# Patient Record
Sex: MALE | Race: WHITE | NOT HISPANIC OR LATINO | URBAN - METROPOLITAN AREA
[De-identification: names, ages, dates, MRNs, and addresses within clinical notes are randomized per-mention and may not be internally consistent; named-entity substitution may affect disease eponyms.]

---

## 2020-01-15 ENCOUNTER — INPATIENT (INPATIENT)
Facility: HOSPITAL | Age: 55
LOS: 2 days | Discharge: HOME | DRG: 247 | End: 2020-01-18
Attending: HOSPITALIST | Admitting: INTERNAL MEDICINE
Payer: COMMERCIAL

## 2020-01-15 ENCOUNTER — EMERGENCY (EMERGENCY)
Facility: HOSPITAL | Age: 55
LOS: 1 days | Discharge: ANOTHER HOSPITAL | End: 2020-01-15
Attending: EMERGENCY MEDICINE | Admitting: EMERGENCY MEDICINE
Payer: COMMERCIAL

## 2020-01-15 VITALS
TEMPERATURE: 98 F | HEART RATE: 74 BPM | OXYGEN SATURATION: 97 % | SYSTOLIC BLOOD PRESSURE: 136 MMHG | DIASTOLIC BLOOD PRESSURE: 90 MMHG | RESPIRATION RATE: 18 BRPM

## 2020-01-15 VITALS
HEART RATE: 78 BPM | OXYGEN SATURATION: 100 % | TEMPERATURE: 98 F | DIASTOLIC BLOOD PRESSURE: 98 MMHG | SYSTOLIC BLOOD PRESSURE: 142 MMHG | RESPIRATION RATE: 18 BRPM

## 2020-01-15 VITALS
DIASTOLIC BLOOD PRESSURE: 91 MMHG | SYSTOLIC BLOOD PRESSURE: 136 MMHG | WEIGHT: 220.46 LBS | TEMPERATURE: 98 F | HEART RATE: 62 BPM | OXYGEN SATURATION: 99 % | RESPIRATION RATE: 16 BRPM | HEIGHT: 67 IN

## 2020-01-15 DIAGNOSIS — Z98.890 OTHER SPECIFIED POSTPROCEDURAL STATES: Chronic | ICD-10-CM

## 2020-01-15 DIAGNOSIS — I21.21 ST ELEVATION (STEMI) MYOCARDIAL INFARCTION INVOLVING LEFT CIRCUMFLEX CORONARY ARTERY: ICD-10-CM

## 2020-01-15 DIAGNOSIS — I25.10 ATHEROSCLEROTIC HEART DISEASE OF NATIVE CORONARY ARTERY WITHOUT ANGINA PECTORIS: ICD-10-CM

## 2020-01-15 LAB
ALBUMIN SERPL ELPH-MCNC: 3.9 G/DL — SIGNIFICANT CHANGE UP (ref 3.3–5)
ALBUMIN SERPL ELPH-MCNC: 4.2 G/DL — SIGNIFICANT CHANGE UP (ref 3.3–5)
ALP SERPL-CCNC: 84 U/L — SIGNIFICANT CHANGE UP (ref 40–120)
ALP SERPL-CCNC: 91 U/L — SIGNIFICANT CHANGE UP (ref 40–120)
ALT FLD-CCNC: 39 U/L — SIGNIFICANT CHANGE UP (ref 12–78)
ALT FLD-CCNC: 59 U/L — HIGH (ref 10–45)
ANION GAP SERPL CALC-SCNC: 17 MMOL/L — SIGNIFICANT CHANGE UP (ref 5–17)
ANION GAP SERPL CALC-SCNC: 8 MMOL/L — SIGNIFICANT CHANGE UP (ref 5–17)
APTT BLD: 33.8 SEC — SIGNIFICANT CHANGE UP (ref 28.5–37)
AST SERPL-CCNC: 272 U/L — HIGH (ref 10–40)
AST SERPL-CCNC: 35 U/L — SIGNIFICANT CHANGE UP (ref 15–37)
BILIRUB SERPL-MCNC: 0.3 MG/DL — SIGNIFICANT CHANGE UP (ref 0.2–1.2)
BILIRUB SERPL-MCNC: 0.4 MG/DL — SIGNIFICANT CHANGE UP (ref 0.2–1.2)
BUN SERPL-MCNC: 12 MG/DL — SIGNIFICANT CHANGE UP (ref 7–23)
BUN SERPL-MCNC: 13 MG/DL — SIGNIFICANT CHANGE UP (ref 7–23)
CALCIUM SERPL-MCNC: 8.5 MG/DL — SIGNIFICANT CHANGE UP (ref 8.5–10.1)
CALCIUM SERPL-MCNC: 8.8 MG/DL — SIGNIFICANT CHANGE UP (ref 8.4–10.5)
CHLORIDE SERPL-SCNC: 103 MMOL/L — SIGNIFICANT CHANGE UP (ref 96–108)
CHLORIDE SERPL-SCNC: 109 MMOL/L — HIGH (ref 96–108)
CO2 SERPL-SCNC: 18 MMOL/L — LOW (ref 22–31)
CO2 SERPL-SCNC: 24 MMOL/L — SIGNIFICANT CHANGE UP (ref 22–31)
CREAT SERPL-MCNC: 0.77 MG/DL — SIGNIFICANT CHANGE UP (ref 0.5–1.3)
CREAT SERPL-MCNC: 0.91 MG/DL — SIGNIFICANT CHANGE UP (ref 0.5–1.3)
GLUCOSE SERPL-MCNC: 113 MG/DL — HIGH (ref 70–99)
GLUCOSE SERPL-MCNC: 119 MG/DL — HIGH (ref 70–99)
HCT VFR BLD CALC: 42.1 % — SIGNIFICANT CHANGE UP (ref 39–50)
HCT VFR BLD CALC: 42.5 % — SIGNIFICANT CHANGE UP (ref 39–50)
HGB BLD-MCNC: 14.9 G/DL — SIGNIFICANT CHANGE UP (ref 13–17)
HGB BLD-MCNC: 15.1 G/DL — SIGNIFICANT CHANGE UP (ref 13–17)
INR BLD: 1.01 RATIO — SIGNIFICANT CHANGE UP (ref 0.88–1.16)
LACTATE SERPL-SCNC: 1.4 MMOL/L — SIGNIFICANT CHANGE UP (ref 0.7–2)
MAGNESIUM SERPL-MCNC: 2 MG/DL — SIGNIFICANT CHANGE UP (ref 1.6–2.6)
MCHC RBC-ENTMCNC: 29.4 PG — SIGNIFICANT CHANGE UP (ref 27–34)
MCHC RBC-ENTMCNC: 29.8 PG — SIGNIFICANT CHANGE UP (ref 27–34)
MCHC RBC-ENTMCNC: 35.1 GM/DL — SIGNIFICANT CHANGE UP (ref 32–36)
MCHC RBC-ENTMCNC: 35.9 GM/DL — SIGNIFICANT CHANGE UP (ref 32–36)
MCV RBC AUTO: 83 FL — SIGNIFICANT CHANGE UP (ref 80–100)
MCV RBC AUTO: 84 FL — SIGNIFICANT CHANGE UP (ref 80–100)
NRBC # BLD: 0 /100 WBCS — SIGNIFICANT CHANGE UP (ref 0–0)
NRBC # BLD: 0 /100 WBCS — SIGNIFICANT CHANGE UP (ref 0–0)
NT-PROBNP SERPL-SCNC: 169 PG/ML — SIGNIFICANT CHANGE UP (ref 0–300)
PHOSPHATE SERPL-MCNC: 2.5 MG/DL — SIGNIFICANT CHANGE UP (ref 2.5–4.5)
PLATELET # BLD AUTO: 255 K/UL — SIGNIFICANT CHANGE UP (ref 150–400)
PLATELET # BLD AUTO: 257 K/UL — SIGNIFICANT CHANGE UP (ref 150–400)
POTASSIUM SERPL-MCNC: 3.8 MMOL/L — SIGNIFICANT CHANGE UP (ref 3.5–5.3)
POTASSIUM SERPL-MCNC: 4.1 MMOL/L — SIGNIFICANT CHANGE UP (ref 3.5–5.3)
POTASSIUM SERPL-SCNC: 3.8 MMOL/L — SIGNIFICANT CHANGE UP (ref 3.5–5.3)
POTASSIUM SERPL-SCNC: 4.1 MMOL/L — SIGNIFICANT CHANGE UP (ref 3.5–5.3)
PROT SERPL-MCNC: 7 G/DL — SIGNIFICANT CHANGE UP (ref 6–8.3)
PROT SERPL-MCNC: 7.6 G/DL — SIGNIFICANT CHANGE UP (ref 6–8.3)
PROTHROM AB SERPL-ACNC: 11.4 SEC — SIGNIFICANT CHANGE UP (ref 10–12.9)
RBC # BLD: 5.06 M/UL — SIGNIFICANT CHANGE UP (ref 4.2–5.8)
RBC # BLD: 5.07 M/UL — SIGNIFICANT CHANGE UP (ref 4.2–5.8)
RBC # FLD: 12.8 % — SIGNIFICANT CHANGE UP (ref 10.3–14.5)
RBC # FLD: 13 % — SIGNIFICANT CHANGE UP (ref 10.3–14.5)
SODIUM SERPL-SCNC: 138 MMOL/L — SIGNIFICANT CHANGE UP (ref 135–145)
SODIUM SERPL-SCNC: 141 MMOL/L — SIGNIFICANT CHANGE UP (ref 135–145)
TROPONIN I SERPL-MCNC: 0.5 NG/ML — HIGH (ref 0.01–0.04)
WBC # BLD: 11.93 K/UL — HIGH (ref 3.8–10.5)
WBC # BLD: 12.52 K/UL — HIGH (ref 3.8–10.5)
WBC # FLD AUTO: 11.93 K/UL — HIGH (ref 3.8–10.5)
WBC # FLD AUTO: 12.52 K/UL — HIGH (ref 3.8–10.5)

## 2020-01-15 PROCEDURE — 96374 THER/PROPH/DIAG INJ IV PUSH: CPT

## 2020-01-15 PROCEDURE — 99152 MOD SED SAME PHYS/QHP 5/>YRS: CPT | Mod: GC

## 2020-01-15 PROCEDURE — 99284 EMERGENCY DEPT VISIT MOD MDM: CPT

## 2020-01-15 PROCEDURE — 93010 ELECTROCARDIOGRAM REPORT: CPT

## 2020-01-15 PROCEDURE — 85610 PROTHROMBIN TIME: CPT

## 2020-01-15 PROCEDURE — 99285 EMERGENCY DEPT VISIT HI MDM: CPT | Mod: 25

## 2020-01-15 PROCEDURE — 85027 COMPLETE CBC AUTOMATED: CPT

## 2020-01-15 PROCEDURE — 96361 HYDRATE IV INFUSION ADD-ON: CPT

## 2020-01-15 PROCEDURE — 93458 L HRT ARTERY/VENTRICLE ANGIO: CPT | Mod: 26,59,GC

## 2020-01-15 PROCEDURE — 80053 COMPREHEN METABOLIC PANEL: CPT

## 2020-01-15 PROCEDURE — 36415 COLL VENOUS BLD VENIPUNCTURE: CPT

## 2020-01-15 PROCEDURE — 92941 PRQ TRLML REVSC TOT OCCL AMI: CPT | Mod: LC,GC

## 2020-01-15 PROCEDURE — 71045 X-RAY EXAM CHEST 1 VIEW: CPT

## 2020-01-15 PROCEDURE — 93005 ELECTROCARDIOGRAM TRACING: CPT

## 2020-01-15 PROCEDURE — 71045 X-RAY EXAM CHEST 1 VIEW: CPT | Mod: 26

## 2020-01-15 PROCEDURE — 99223 1ST HOSP IP/OBS HIGH 75: CPT | Mod: GC

## 2020-01-15 PROCEDURE — 85730 THROMBOPLASTIN TIME PARTIAL: CPT

## 2020-01-15 PROCEDURE — 84484 ASSAY OF TROPONIN QUANT: CPT

## 2020-01-15 RX ORDER — TICAGRELOR 90 MG/1
90 TABLET ORAL EVERY 12 HOURS
Refills: 0 | Status: DISCONTINUED | OUTPATIENT
Start: 2020-01-16 | End: 2020-01-18

## 2020-01-15 RX ORDER — TICAGRELOR 90 MG/1
180 TABLET ORAL ONCE
Refills: 0 | Status: DISCONTINUED | OUTPATIENT
Start: 2020-01-15 | End: 2020-01-15

## 2020-01-15 RX ORDER — ASPIRIN/CALCIUM CARB/MAGNESIUM 324 MG
325 TABLET ORAL ONCE
Refills: 0 | Status: COMPLETED | OUTPATIENT
Start: 2020-01-15 | End: 2020-01-15

## 2020-01-15 RX ORDER — INFLUENZA VIRUS VACCINE 15; 15; 15; 15 UG/.5ML; UG/.5ML; UG/.5ML; UG/.5ML
0.5 SUSPENSION INTRAMUSCULAR ONCE
Refills: 0 | Status: COMPLETED | OUTPATIENT
Start: 2020-01-15 | End: 2020-01-18

## 2020-01-15 RX ORDER — MORPHINE SULFATE 50 MG/1
4 CAPSULE, EXTENDED RELEASE ORAL ONCE
Refills: 0 | Status: COMPLETED | OUTPATIENT
Start: 2020-01-15 | End: 2020-01-15

## 2020-01-15 RX ORDER — ASPIRIN/CALCIUM CARB/MAGNESIUM 324 MG
81 TABLET ORAL DAILY
Refills: 0 | Status: DISCONTINUED | OUTPATIENT
Start: 2020-01-16 | End: 2020-01-18

## 2020-01-15 RX ORDER — HEPARIN SODIUM 5000 [USP'U]/ML
4000 INJECTION INTRAVENOUS; SUBCUTANEOUS ONCE
Refills: 0 | Status: COMPLETED | OUTPATIENT
Start: 2020-01-15 | End: 2020-01-15

## 2020-01-15 RX ORDER — ONDANSETRON 8 MG/1
4 TABLET, FILM COATED ORAL ONCE
Refills: 0 | Status: DISCONTINUED | OUTPATIENT
Start: 2020-01-15 | End: 2020-02-06

## 2020-01-15 RX ORDER — PANTOPRAZOLE SODIUM 20 MG/1
40 TABLET, DELAYED RELEASE ORAL
Refills: 0 | Status: DISCONTINUED | OUTPATIENT
Start: 2020-01-15 | End: 2020-01-16

## 2020-01-15 RX ORDER — CLOPIDOGREL BISULFATE 75 MG/1
300 TABLET, FILM COATED ORAL ONCE
Refills: 0 | Status: COMPLETED | OUTPATIENT
Start: 2020-01-15 | End: 2020-01-15

## 2020-01-15 RX ORDER — CHLORHEXIDINE GLUCONATE 213 G/1000ML
1 SOLUTION TOPICAL
Refills: 0 | Status: DISCONTINUED | OUTPATIENT
Start: 2020-01-15 | End: 2020-01-17

## 2020-01-15 RX ORDER — ATORVASTATIN CALCIUM 80 MG/1
80 TABLET, FILM COATED ORAL AT BEDTIME
Refills: 0 | Status: DISCONTINUED | OUTPATIENT
Start: 2020-01-15 | End: 2020-01-18

## 2020-01-15 RX ORDER — SODIUM CHLORIDE 9 MG/ML
1000 INJECTION INTRAMUSCULAR; INTRAVENOUS; SUBCUTANEOUS ONCE
Refills: 0 | Status: COMPLETED | OUTPATIENT
Start: 2020-01-15 | End: 2020-01-15

## 2020-01-15 RX ORDER — HEPARIN SODIUM 5000 [USP'U]/ML
INJECTION INTRAVENOUS; SUBCUTANEOUS
Qty: 25000 | Refills: 0 | Status: DISCONTINUED | OUTPATIENT
Start: 2020-01-15 | End: 2020-02-06

## 2020-01-15 RX ADMIN — CLOPIDOGREL BISULFATE 300 MILLIGRAM(S): 75 TABLET, FILM COATED ORAL at 20:32

## 2020-01-15 RX ADMIN — HEPARIN SODIUM 1000 UNIT(S)/HR: 5000 INJECTION INTRAVENOUS; SUBCUTANEOUS at 20:54

## 2020-01-15 RX ADMIN — Medication 325 MILLIGRAM(S): at 20:27

## 2020-01-15 RX ADMIN — HEPARIN SODIUM 4000 UNIT(S): 5000 INJECTION INTRAVENOUS; SUBCUTANEOUS at 20:27

## 2020-01-15 RX ADMIN — SODIUM CHLORIDE 1000 MILLILITER(S): 9 INJECTION INTRAMUSCULAR; INTRAVENOUS; SUBCUTANEOUS at 20:45

## 2020-01-15 RX ADMIN — SODIUM CHLORIDE 1000 MILLILITER(S): 9 INJECTION INTRAMUSCULAR; INTRAVENOUS; SUBCUTANEOUS at 21:46

## 2020-01-15 RX ADMIN — CLOPIDOGREL BISULFATE 300 MILLIGRAM(S): 75 TABLET, FILM COATED ORAL at 20:58

## 2020-01-15 NOTE — H&P ADULT - NSHPPHYSICALEXAM_GEN_ALL_CORE
PHYSICAL EXAM:  GENERAL: NAD, well-developed  HEAD:  Atraumatic, Normocephalic, patient with enlarged non tender  R posterior auricular LAD   EYES: EOMI, PERRLA, conjunctiva and sclera clear  NECK: Supple, No JVD,  CHEST/LUNG: Clear to auscultation bilaterally; No wheeze  HEART: Regular rate and rhythm; No murmurs, rubs, or gallops  ABDOMEN: Soft, Nontender, Nondistended; Bowel sounds present  EXTREMITIES:, No clubbing, cyanosis, or edema, femoral sheath in place   PSYCH: AAOx3  NEUROLOGY: non-focal  SKIN: No rashes or lesions

## 2020-01-15 NOTE — H&P ADULT - NSHPREVIEWOFSYSTEMS_GEN_ALL_CORE
REVIEW OF SYSTEMS:  CONSTITUTIONAL: No weakness, fevers or chills  EYES/ENT: No visual changes;  No vertigo or throat pain   NECK: No pain or stiffness  RESPIRATORY: No cough, wheezing, hemoptysis; No shortness of breath  CARDIOVASCULAR: 5/10 CP radiating to the left jaw and arm   GASTROINTESTINAL: No abdominal or epigastric pain. No nausea, vomiting, or hematemesis; No diarrhea or constipation. No melena or hematochezia.  GENITOURINARY: No dysuria, frequency or hematuria  NEUROLOGICAL: No numbness or weakness  SKIN: No itching, burning, rashes, or lesions   All other review of systems is negative unless indicated above.

## 2020-01-15 NOTE — H&P ADULT - HISTORY OF PRESENT ILLNESS
53 yo M PMHx gastritis initially presenting with chief complaint of chest pain radiating to the jaw and left arm admitted with STEMI. Patient states that he is employed as a , he noticed yesterday afternoon he experienced severe crushing chest pain 10/10 in severity radiating to his left arm. He was diaphoretic, nauseas, vomited X1 non bloody, non bilious emesis. He took Advil with little to no relief.  He states the pain was significantly reduced 2 hours later and he was able to rest in bed overnight. Today, however he noticed around 6 pm that he had significant 10/10 pain once more. He went to an outpatient clinic where he was found to have EKG changes and [facility] noted findings of inferolateral wall STEMI. Troponin I with .503, patient remained to be hemodynamically stable, saturating 99% on RA.  He was loaded with ASA and plavix at [place], was taken to cath lab, found to have 100% stenosis to the circumflex,  DESX1 .      Vitals on triage: /91, Hr 62, RR 16,T 97.5,99 % room air

## 2020-01-15 NOTE — ED PROVIDER NOTE - OBJECTIVE STATEMENT
55 yo M PMHx gastritis presents to ED c/o left sided chest pain radiating into jaw x last night. Pt states that pain started suddenly while at work yesterday afternoon (pt works in construction)- Pt took OTC Motrin before bed last night which relieved the pain slightly and he went to sleep. States pain returned a few hours prior to ED arrival, more severe in intensity. Pt reports associated shortness of breath.

## 2020-01-15 NOTE — ED ADULT NURSE NOTE - OBJECTIVE STATEMENT
Received from Home with c/o chest pain since 6pm when at work. Took and Advil to no relief. Pain in the left side of chest radiating to left back , left jaw and left hand with slight nausea. AO4, Ambulatory. Vitals stable now. Started O2 at 2L by NC. EKG and CXR done. Line and labs done. To be transferred to [facility] [facility] lab.

## 2020-01-15 NOTE — ED PROVIDER NOTE - ATTENDING CONTRIBUTION TO CARE
I have personally performed a face to face diagnostic evaluation on this patient.  I have reviewed the PA note and agree with the history, exam, and plan of care, except as noted.  History and Exam by me shows patient presents to ER with friend c/o chest pain, patient states he had chest pain yesterday while at work that improved with advil, today at 6pm patient chest pain returned, left sided, radiating to left neck and bilateral arms, patient at first went to an urgent care but was told to go to nearest ER, patient alert and oriented, heart and lungs clear, abdomen soft, no pedal edema, f/u ekg, labs, chest xray, cardiology.
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
## 2020-01-15 NOTE — H&P ADULT - ASSESSMENT
54 year old male with PMHx for gastritis presents from [facility] with EKG findings consistent with inferior lateral wall stemi, s/p revascularization with 1 stent placed to the circumflex artery    #Neuro  -Aox3, no active issues     #Resp  Saturating 99% on RA     #CV  Inferolateral wall stemi   - Aneta criteria III/ III Substernal chest pain, worsened with exertion and relieved with rest   - EKG with evidence of ST elevations in II ,III, AVF and V4-V6, ST depressions in leads v1-v3  - Troponin I positive at [place] .501, will continue to trend cardiac enzymes to peak    - S/P LHC with findings c/w 100% stenosis to circumflex artery X 1 GUILLERMO   - Prior to LHC was ASA and plavix loaded at OSH, following LHC loaded with brillinta   - Will continue with DAPT w/ ASA and Brillinta   - Will pull the sheath 4 hours following cath, monitor groin site overnight   - Patient remains to have slight CP which he reports to be 5/10 in severity will continue to monitor clinically   - Currently Hemodynamically stable  - lipid profile currently pendng     #GI  Gastritis     - C/w home  pantoprazole     #ID  - Slight leukocytosis, likely reactive   - will continue to monitor  - No active signs of infection     #Renal  - No renal issues   - Will monitor with daily BMP, following contrast load from cath     #Heme  -No active issues     #Endo  - TSH, hemoglobin a1c pending     #DVT PPx  - Following removal of sheath, hep sub q

## 2020-01-15 NOTE — H&P ADULT - NSHPLABSRESULTS_GEN_ALL_CORE
15.1   11.93 )-----------( 257      ( 15 Kamran 2020 20:40 )             42.1   01-15    141  |  109<H>  |  13  ----------------------------<  113<H>  3.8   |  24  |  0.91    Ca    8.5      15 Kamran 2020 20:41    TPro  7.6  /  Alb  3.9  /  TBili  0.3  /  DBili  x   /  AST  35  /  ALT  39  /  AlkPhos  91  01-15  CARDIAC MARKERS ( 15 Kamran 2020 20:41 )  .503 ng/mL / x     / x     / x     / x

## 2020-01-16 LAB
ALBUMIN SERPL ELPH-MCNC: 4 G/DL — SIGNIFICANT CHANGE UP (ref 3.3–5)
ALP SERPL-CCNC: 74 U/L — SIGNIFICANT CHANGE UP (ref 40–120)
ALT FLD-CCNC: 81 U/L — HIGH (ref 10–45)
ANION GAP SERPL CALC-SCNC: 15 MMOL/L — SIGNIFICANT CHANGE UP (ref 5–17)
AST SERPL-CCNC: 393 U/L — HIGH (ref 10–40)
BILIRUB SERPL-MCNC: 0.6 MG/DL — SIGNIFICANT CHANGE UP (ref 0.2–1.2)
BUN SERPL-MCNC: 13 MG/DL — SIGNIFICANT CHANGE UP (ref 7–23)
CALCIUM SERPL-MCNC: 8.8 MG/DL — SIGNIFICANT CHANGE UP (ref 8.4–10.5)
CHLORIDE SERPL-SCNC: 103 MMOL/L — SIGNIFICANT CHANGE UP (ref 96–108)
CHOLEST SERPL-MCNC: 173 MG/DL — SIGNIFICANT CHANGE UP (ref 10–199)
CK MB BLD-MCNC: 3.3 % — SIGNIFICANT CHANGE UP (ref 0–3.5)
CK MB BLD-MCNC: 3.8 % — HIGH (ref 0–3.5)
CK MB BLD-MCNC: 4.1 % — HIGH (ref 0–3.5)
CK MB CFR SERPL CALC: 110.6 NG/ML — HIGH (ref 0–6.7)
CK MB CFR SERPL CALC: 123 NG/ML — HIGH (ref 0–6.7)
CK MB CFR SERPL CALC: 172.9 NG/ML — HIGH (ref 0–6.7)
CK SERPL-CCNC: 2919 U/L — HIGH (ref 30–200)
CK SERPL-CCNC: 3758 U/L — HIGH (ref 30–200)
CK SERPL-CCNC: 4248 U/L — HIGH (ref 30–200)
CO2 SERPL-SCNC: 19 MMOL/L — LOW (ref 22–31)
CREAT SERPL-MCNC: 0.79 MG/DL — SIGNIFICANT CHANGE UP (ref 0.5–1.3)
GLUCOSE SERPL-MCNC: 128 MG/DL — HIGH (ref 70–99)
HBA1C BLD-MCNC: 5.2 % — SIGNIFICANT CHANGE UP (ref 4–5.6)
HCT VFR BLD CALC: 41.8 % — SIGNIFICANT CHANGE UP (ref 39–50)
HDLC SERPL-MCNC: 30 MG/DL — LOW
HGB BLD-MCNC: 14.5 G/DL — SIGNIFICANT CHANGE UP (ref 13–17)
LACTATE SERPL-SCNC: 1.2 MMOL/L — SIGNIFICANT CHANGE UP (ref 0.7–2)
LIPID PNL WITH DIRECT LDL SERPL: 121 MG/DL — HIGH
MAGNESIUM SERPL-MCNC: 2 MG/DL — SIGNIFICANT CHANGE UP (ref 1.6–2.6)
MCHC RBC-ENTMCNC: 29.1 PG — SIGNIFICANT CHANGE UP (ref 27–34)
MCHC RBC-ENTMCNC: 34.7 GM/DL — SIGNIFICANT CHANGE UP (ref 32–36)
MCV RBC AUTO: 83.8 FL — SIGNIFICANT CHANGE UP (ref 80–100)
NRBC # BLD: 0 /100 WBCS — SIGNIFICANT CHANGE UP (ref 0–0)
PHOSPHATE SERPL-MCNC: 3.1 MG/DL — SIGNIFICANT CHANGE UP (ref 2.5–4.5)
PLATELET # BLD AUTO: 245 K/UL — SIGNIFICANT CHANGE UP (ref 150–400)
POTASSIUM SERPL-MCNC: 4.2 MMOL/L — SIGNIFICANT CHANGE UP (ref 3.5–5.3)
POTASSIUM SERPL-SCNC: 4.2 MMOL/L — SIGNIFICANT CHANGE UP (ref 3.5–5.3)
PROT SERPL-MCNC: 6.7 G/DL — SIGNIFICANT CHANGE UP (ref 6–8.3)
RBC # BLD: 4.99 M/UL — SIGNIFICANT CHANGE UP (ref 4.2–5.8)
RBC # FLD: 12.8 % — SIGNIFICANT CHANGE UP (ref 10.3–14.5)
SODIUM SERPL-SCNC: 137 MMOL/L — SIGNIFICANT CHANGE UP (ref 135–145)
TOTAL CHOLESTEROL/HDL RATIO MEASUREMENT: 5.7 RATIO — SIGNIFICANT CHANGE UP (ref 3.4–9.6)
TRIGL SERPL-MCNC: 108 MG/DL — SIGNIFICANT CHANGE UP (ref 10–149)
TROPONIN T, HIGH SENSITIVITY RESULT: 4637 NG/L — HIGH (ref 0–51)
TROPONIN T, HIGH SENSITIVITY RESULT: 4733 NG/L — HIGH (ref 0–51)
TROPONIN T, HIGH SENSITIVITY RESULT: 6022 NG/L — HIGH (ref 0–51)
TSH SERPL-MCNC: 3.27 UIU/ML — SIGNIFICANT CHANGE UP (ref 0.27–4.2)
WBC # BLD: 12.74 K/UL — HIGH (ref 3.8–10.5)
WBC # FLD AUTO: 12.74 K/UL — HIGH (ref 3.8–10.5)

## 2020-01-16 PROCEDURE — 93306 TTE W/DOPPLER COMPLETE: CPT | Mod: 26

## 2020-01-16 PROCEDURE — 99233 SBSQ HOSP IP/OBS HIGH 50: CPT | Mod: GC

## 2020-01-16 RX ORDER — FAMOTIDINE 10 MG/ML
20 INJECTION INTRAVENOUS ONCE
Refills: 0 | Status: COMPLETED | OUTPATIENT
Start: 2020-01-16 | End: 2020-01-16

## 2020-01-16 RX ORDER — ACETAMINOPHEN 500 MG
650 TABLET ORAL ONCE
Refills: 0 | Status: COMPLETED | OUTPATIENT
Start: 2020-01-16 | End: 2020-01-16

## 2020-01-16 RX ORDER — PANTOPRAZOLE SODIUM 20 MG/1
40 TABLET, DELAYED RELEASE ORAL
Refills: 0 | Status: DISCONTINUED | OUTPATIENT
Start: 2020-01-16 | End: 2020-01-18

## 2020-01-16 RX ORDER — POTASSIUM PHOSPHATE, MONOBASIC POTASSIUM PHOSPHATE, DIBASIC 236; 224 MG/ML; MG/ML
30 INJECTION, SOLUTION INTRAVENOUS ONCE
Refills: 0 | Status: DISCONTINUED | OUTPATIENT
Start: 2020-01-16 | End: 2020-01-16

## 2020-01-16 RX ORDER — ACETAMINOPHEN 500 MG
650 TABLET ORAL EVERY 6 HOURS
Refills: 0 | Status: DISCONTINUED | OUTPATIENT
Start: 2020-01-16 | End: 2020-01-18

## 2020-01-16 RX ORDER — HEPARIN SODIUM 5000 [USP'U]/ML
5000 INJECTION INTRAVENOUS; SUBCUTANEOUS EVERY 8 HOURS
Refills: 0 | Status: DISCONTINUED | OUTPATIENT
Start: 2020-01-16 | End: 2020-01-18

## 2020-01-16 RX ORDER — METOPROLOL TARTRATE 50 MG
25 TABLET ORAL
Refills: 0 | Status: DISCONTINUED | OUTPATIENT
Start: 2020-01-16 | End: 2020-01-16

## 2020-01-16 RX ORDER — METOPROLOL TARTRATE 50 MG
25 TABLET ORAL
Refills: 0 | Status: DISCONTINUED | OUTPATIENT
Start: 2020-01-16 | End: 2020-01-18

## 2020-01-16 RX ORDER — ACETAMINOPHEN 500 MG
1000 TABLET ORAL ONCE
Refills: 0 | Status: COMPLETED | OUTPATIENT
Start: 2020-01-16 | End: 2020-01-16

## 2020-01-16 RX ADMIN — CHLORHEXIDINE GLUCONATE 1 APPLICATION(S): 213 SOLUTION TOPICAL at 06:00

## 2020-01-16 RX ADMIN — Medication 650 MILLIGRAM(S): at 17:26

## 2020-01-16 RX ADMIN — HEPARIN SODIUM 5000 UNIT(S): 5000 INJECTION INTRAVENOUS; SUBCUTANEOUS at 12:58

## 2020-01-16 RX ADMIN — Medication 650 MILLIGRAM(S): at 16:56

## 2020-01-16 RX ADMIN — HEPARIN SODIUM 5000 UNIT(S): 5000 INJECTION INTRAVENOUS; SUBCUTANEOUS at 21:21

## 2020-01-16 RX ADMIN — TICAGRELOR 90 MILLIGRAM(S): 90 TABLET ORAL at 05:07

## 2020-01-16 RX ADMIN — Medication 81 MILLIGRAM(S): at 12:58

## 2020-01-16 RX ADMIN — ATORVASTATIN CALCIUM 80 MILLIGRAM(S): 80 TABLET, FILM COATED ORAL at 00:49

## 2020-01-16 RX ADMIN — FAMOTIDINE 20 MILLIGRAM(S): 10 INJECTION INTRAVENOUS at 05:08

## 2020-01-16 RX ADMIN — TICAGRELOR 90 MILLIGRAM(S): 90 TABLET ORAL at 18:29

## 2020-01-16 RX ADMIN — Medication 25 MILLIGRAM(S): at 09:32

## 2020-01-16 RX ADMIN — Medication 1000 MILLIGRAM(S): at 00:50

## 2020-01-16 RX ADMIN — Medication 400 MILLIGRAM(S): at 00:28

## 2020-01-16 RX ADMIN — Medication 25 MILLIGRAM(S): at 18:29

## 2020-01-16 RX ADMIN — ATORVASTATIN CALCIUM 80 MILLIGRAM(S): 80 TABLET, FILM COATED ORAL at 21:21

## 2020-01-16 NOTE — PROGRESS NOTE ADULT - SUBJECTIVE AND OBJECTIVE BOX
Frantz Nieves, PGY1  Cardiac Care Unit  Internal Medicine  Pager: [phone] ([facility]) / [phone] ([facility])    PATIENT: ARSENIO PEREZ, MRN: [id number]    CHIEF COMPLAINT: Patient is a 54y old  Male who presents with a chief complaint of     INTERVAL HISTORY/OVERNIGHT EVENTS: ... Denies lightheadedness, HA, CP, racing heart, skipped beats, SOB, difficulty breathing, cough, wheezing, abd pain, N/V/D/C, dysuria, leg swelling    REVIEW OF SYSTEMS:    Constitutional:     [ ] negative [ ] fevers [ ] chills [ ] weight loss [ ] weight gain  HEENT:                  [ ] negative [ ] dry eyes [ ] eye irritation [ ] postnasal drip [ ] nasal congestion  CV:                         [ ] negative  [ ] chest pain [ ] orthopnea [ ] palpitations [ ] murmur  Resp:                     [ ] negative [ ] cough [ ] shortness of breath [ ] dyspnea [ ] wheezing [ ] sputum [ ] hemoptysis  GI:                          [ ] negative [ ] nausea [ ] vomiting [ ] diarrhea [ ] constipation [ ] abd pain [ ] dysphagia   :                        [ ] negative [ ] dysuria [ ] nocturia [ ] hematuria [ ] increased urinary frequency  Musculoskeletal: [ ] negative [ ] back pain [ ] myalgias [ ] arthralgias [ ] fracture  Skin:                       [ ] negative [ ] rash [ ] itch  Neurological:        [ ] negative [ ] headache [ ] dizziness [ ] syncope [ ] weakness [ ] numbness  Psychiatric:           [ ] negative [ ] anxiety [ ] depression  Endocrine:            [ ] negative [ ] diabetes [ ] thyroid problem  Heme/Lymph:      [ ] negative [ ] anemia [ ] bleeding problem  Allergic/Immune: [ ] negative [ ] itchy eyes [ ] nasal discharge [ ] hives [ ] angioedema    [ ] All other systems negative  [ ] Unable to assess ROS because ________.    MEDICATIONS:  MEDICATIONS  (STANDING):  aspirin  chewable 81 milliGRAM(s) Oral daily  atorvastatin 80 milliGRAM(s) Oral at bedtime  chlorhexidine 2% Cloths 1 Application(s) Topical <User Schedule>  influenza   Vaccine 0.5 milliLiter(s) IntraMuscular once  ticagrelor 90 milliGRAM(s) Oral every 12 hours    MEDICATIONS  (PRN):      ALLERGIES: Allergies    No Known Allergies    Intolerances        OBJECTIVE:  ICU Vital Signs Last 24 Hrs  T(C): 36.6 (2020 05:00), Max: 36.9 (15 Kamran 2020 20:55)  T(F): 97.9 (2020 05:00), Max: 98.5 (15 Kamran 2020 20:55)  HR: 64 (2020 06:00) (60 - 78)  BP: 110/75 (2020 06:00) (89/59 - 147/92)  BP(mean): 85 (2020 06:00) (68 - 109)  ABP: --  ABP(mean): --  RR: 18 (2020 06:00) (16 - 20)  SpO2: 97% (2020 06:00) (94% - 100%)      Adult Advanced Hemodynamics Last 24 Hrs  CVP(mm Hg): --  CVP(cm H2O): --  CO: --  CI: --  PA: --  PA(mean): --  PCWP: --  SVR: --  SVRI: --  PVR: --  PVRI: --  CAPILLARY BLOOD GLUCOSE        CAPILLARY BLOOD GLUCOSE        I&O's Summary    15 Kamran 2020 07:01  -  2020 07:00  --------------------------------------------------------  IN: 0 mL / OUT: 850 mL / NET: -850 mL      Daily     Daily Weight in k.2 (15 Kamran 2020 22:30)    HF:    PHYSICAL EXAMINATION:  GEN: , NAD  HEENT: PERRL; mucous moist; neck supple; neg JVD  CV: RRR, S1, S2; no M/R/G; good capillary refill  PULM: LCTAB; not using accessory muscles  ABD: normal bowel sounds; non-distended, soft; non-tender; no rebound, no guarding  BACK: no CVA tenderness  Extremities: no edema; DP and radial pulses palpable  NEURO: no FND  Incisions:   Tubes:    LABS:                          14.5   12.74 )-----------( 245      ( 2020 04:54 )             41.8     01-16    137  |  103  |  13  ----------------------------<  128<H>  4.2   |  19<L>  |  0.79    Ca    8.8      2020 04:54  Phos  3.1       Mg     2.0         TPro  6.7  /  Alb  4.0  /  TBili  0.6  /  DBili  x   /  AST  393<H>  /  ALT  81<H>  /  AlkPhos  74      LIVER FUNCTIONS - ( 2020 04:54 )  Alb: 4.0 g/dL / Pro: 6.7 g/dL / ALK PHOS: 74 U/L / ALT: 81 U/L / AST: 393 U/L / GGT: x           PT/INR - ( 15 Kamran 2020 20:40 )   PT: 11.4 sec;   INR: 1.01 ratio         PTT - ( 15 Kamran 2020 20:40 )  PTT:33.8 sec  CKMB Units: 172.9 ng/mL ( @ 04:54)  Creatine Kinase, Serum: 4248 U/L ( @ 04:54)  Creatine Kinase, Serum: 3758 U/L (01-15 @ 23:21)  CKMB Units: 123.0 ng/mL (01-15 @ 23:21)    CARDIAC MARKERS ( 2020 04:54 )  x     / x     / 4248 U/L / x     / 172.9 ng/mL  CARDIAC MARKERS ( 15 Kamran 2020 23:21 )  x     / x     / 3758 U/L / x     / 123.0 ng/mL  CARDIAC MARKERS ( 15 Kamran 2020 20:41 )  .503 ng/mL / x     / x     / x     / x              TELEMETRY:     EKG:     IMAGING: Frantz Nieves, PGY1  Cardiac Care Unit  Internal Medicine  Pager: [phone] ([facility]) / [phone] ([facility])    PATIENT: ARSENIO PEREZ, MRN: [id number]    CHIEF COMPLAINT: Patient is a 54y old  Male who presents with a chief complaint of     INTERVAL HISTORY/OVERNIGHT EVENTS: transferred from [facility] for STEMI, s/p PCI to circumflex c/b poor reperfusion; this AM, endorses 2/10 left-sided CP radiating to the back; denies lightheadedness, HA, racing heart, skipped beats, SOB, difficulty breathing, cough, wheezing, abd pain, N/V/D/C, dysuria, leg swelling    REVIEW OF SYSTEMS: as above    MEDICATIONS:  MEDICATIONS  (STANDING):  aspirin  chewable 81 milliGRAM(s) Oral daily  atorvastatin 80 milliGRAM(s) Oral at bedtime  chlorhexidine 2% Cloths 1 Application(s) Topical <User Schedule>  influenza   Vaccine 0.5 milliLiter(s) IntraMuscular once  ticagrelor 90 milliGRAM(s) Oral every 12 hours    MEDICATIONS  (PRN):    ALLERGIES: Allergies    No Known Allergies    Intolerances    OBJECTIVE:  ICU Vital Signs Last 24 Hrs  T(C): 36.6 (2020 05:00), Max: 36.9 (15 Kamran 2020 20:55)  T(F): 97.9 (2020 05:00), Max: 98.5 (15 Kamran 2020 20:55)  HR: 64 (2020 06:00) (60 - 78)  BP: 110/75 (2020 06:00) (89/59 - 147/92)  BP(mean): 85 (2020 06:00) (68 - 109)  ABP: --  ABP(mean): --  RR: 18 (2020 06:00) (16 - 20)  SpO2: 97% (2020 06:00) (94% - 100%)    CAPILLARY BLOOD GLUCOSE    I&O's Summary    15 Kamran 2020 07:01  -  2020 07:00  --------------------------------------------------------  IN: 0 mL / OUT: 850 mL / NET: -850 mL    Daily     Daily Weight in k.2 (15 Kamran 2020 22:30)    PHYSICAL EXAMINATION:  GEN: resting in bed, NAD  HEENT: PERRL; mucous moist; neck supple; neg JVD  CV: RRR, S1, S2; no M/R/G; good capillary refill  PULM: LCTAB; not using accessory muscles  ABD: normal bowel sounds; non-distended, soft; non-tender; no rebound, no guarding  Extremities: no edema; DP and radial pulses palpable  NEURO: no FND  Incisions:   Tubes:    LABS:                        14.5   12.74 )-----------( 245      ( 2020 04:54 )             41.8         137  |  103  |  13  ----------------------------<  128<H>  4.2   |  19<L>  |  0.79    Ca    8.8      2020 04:54  Phos  3.1       Mg     2.0         TPro  6.7  /  Alb  4.0  /  TBili  0.6  /  DBili  x   /  AST  393<H>  /  ALT  81<H>  /  AlkPhos  74      LIVER FUNCTIONS - ( 2020 04:54 )  Alb: 4.0 g/dL / Pro: 6.7 g/dL / ALK PHOS: 74 U/L / ALT: 81 U/L / AST: 393 U/L / GGT: x           PT/INR - ( 15 Kamran 2020 20:40 )   PT: 11.4 sec;   INR: 1.01 ratio         PTT - ( 15 Kamran 2020 20:40 )  PTT:33.8 sec  CKMB Units: 172.9 ng/mL ( @ 04:54)  Creatine Kinase, Serum: 4248 U/L ( @ 04:54)  Creatine Kinase, Serum: 3758 U/L (01-15 @ 23:21)  CKMB Units: 123.0 ng/mL (01-15 @ 23:21)    CARDIAC MARKERS ( 2020 04:54 )  x     / x     / 4248 U/L / x     / 172.9 ng/mL  CARDIAC MARKERS ( 15 Kamran 2020 23:21 )  x     / x     / 3758 U/L / x     / 123.0 ng/mL  CARDIAC MARKERS ( 15 [place] 2020 20:41 )  .503 ng/mL / x     / x     / x     / x              TELEMETRY:     EKG:     IMAGING: Frantz Nieves, PGY1  Cardiac Care Unit  Internal Medicine  Pager: [phone] ([facility]) / [phone] ([facility])    PATIENT: ARSENIO PEREZ, MRN: [id number]    CHIEF COMPLAINT: Patient is a 54y old  Male who presents with a chief complaint of     INTERVAL HISTORY/OVERNIGHT EVENTS: transferred from [facility] for STEMI, s/p PCI to circumflex c/b poor reperfusion; this AM, endorses 2/10 left-sided CP radiating to the back; denies lightheadedness, HA, racing heart, skipped beats, SOB, difficulty breathing, cough, wheezing, abd pain, N/V/D/C, dysuria, leg swelling    REVIEW OF SYSTEMS: as above    MEDICATIONS:  MEDICATIONS  (STANDING):  aspirin  chewable 81 milliGRAM(s) Oral daily  atorvastatin 80 milliGRAM(s) Oral at bedtime  chlorhexidine 2% Cloths 1 Application(s) Topical <User Schedule>  influenza   Vaccine 0.5 milliLiter(s) IntraMuscular once  ticagrelor 90 milliGRAM(s) Oral every 12 hours    MEDICATIONS  (PRN):    ALLERGIES: Allergies    No Known Allergies    Intolerances    OBJECTIVE:  ICU Vital Signs Last 24 Hrs  T(C): 36.6 (2020 05:00), Max: 36.9 (15 Kamran 2020 20:55)  T(F): 97.9 (2020 05:00), Max: 98.5 (15 Kamran 2020 20:55)  HR: 64 (2020 06:00) (60 - 78)  BP: 110/75 (2020 06:00) (89/59 - 147/92)  BP(mean): 85 (2020 06:00) (68 - 109)  ABP: --  ABP(mean): --  RR: 18 (2020 06:00) (16 - 20)  SpO2: 97% (2020 06:00) (94% - 100%)    CAPILLARY BLOOD GLUCOSE    I&O's Summary    15 Kamran 2020 07:01  -  2020 07:00  --------------------------------------------------------  IN: 0 mL / OUT: 850 mL / NET: -850 mL    Daily     Daily Weight in k.2 (15 Kamran 2020 22:30)    PHYSICAL EXAMINATION:  GEN: resting in bed, NAD  HEENT: PERRL; mucous moist; neck supple; neg JVD  CV: RRR, S1, S2; no M/R/G; good capillary refill  PULM: LCTAB; not using accessory muscles  ABD: normal bowel sounds; non-distended, soft; non-tender; no rebound, no guarding  Extremities: no edema; DP and radial pulses palpable  NEURO: no FND  Incisions:   Tubes:    LABS:                        14.5   12.74 )-----------( 245      ( 2020 04:54 )             41.8         137  |  103  |  13  ----------------------------<  128<H>  4.2   |  19<L>  |  0.79    Ca    8.8      2020 04:54  Phos  3.1       Mg     2.0         TPro  6.7  /  Alb  4.0  /  TBili  0.6  /  DBili  x   /  AST  393<H>  /  ALT  81<H>  /  AlkPhos  74      LIVER FUNCTIONS - ( 2020 04:54 )  Alb: 4.0 g/dL / Pro: 6.7 g/dL / ALK PHOS: 74 U/L / ALT: 81 U/L / AST: 393 U/L / GGT: x           PT/INR - ( 15 Kamran 2020 20:40 )   PT: 11.4 sec;   INR: 1.01 ratio    PTT - ( 15 Kamran 2020 20:40 )  PTT:33.8 sec    CKMB Units: 172.9 ng/mL ( @ 04:54)  Creatine Kinase, Serum: 4248 U/L ( @ 04:54)  Creatine Kinase, Serum: 3758 U/L (01-15 @ 23:21)  CKMB Units: 123.0 ng/mL (01-15 @ 23:21)    CARDIAC MARKERS ( 2020 11:26 )  x     / x     / 2919 U/L / x     / 110.6 ng/mL  CARDIAC MARKERS ( 2020 04:54 )  x     / x     / 4248 U/L / x     / 172.9 ng/mL  CARDIAC MARKERS ( 15 Kamran 2020 23:21 )  x     / x     / 3758 U/L / x     / 123.0 ng/mL  CARDIAC MARKERS ( 15 Kamran 2020 20:41 )  .503 ng/mL / x     / x     / x     / x          TELEMETRY:     EKG:     IMAGING:

## 2020-01-16 NOTE — PROGRESS NOTE ADULT - SUBJECTIVE AND OBJECTIVE BOX
====================  CCU MIDNIGHT ROUNDS  ====================    Armenian ANA  [id number]    ====================  SUMMARY:  ====================        ====================  NEW EVENTS:  ====================        ====================  VITALS (Last 12 hrs):  ====================    T(C): 36.9 (01-16-20 @ 16:00), Max: 36.9 (01-16-20 @ 16:00)  HR: 84 (01-16-20 @ 20:00) (72 - 106)  BP: 103/69 (01-16-20 @ 20:00) (93/66 - 124/84)  BP(mean): 80 (01-16-20 @ 20:00) (73 - 99)  ABP: --  ABP(mean): --  RR: 17 (01-16-20 @ 10:00) (17 - 17)  SpO2: 95% (01-16-20 @ 20:00) (90% - 97%)  Wt(kg): --  CVP(mm Hg): --  CO: --  CI: --  PA: --  PA(mean): --  PA(direct): --  PCWP: --  SVR: --    TELEMETRY:        *BLOOD GAS/ARTERIAL/MIXED/VENOUS  *LACTATE    I&O's Summary    15 Kamran 2020 07:01  -  16 Jan 2020 07:00  --------------------------------------------------------  IN: 0 mL / OUT: 850 mL / NET: -850 mL    16 Jan 2020 07:01 - 16 Jan 2020 21:15  --------------------------------------------------------  IN: 480 mL / OUT: 750 mL / NET: -270 mL        ====================  PLAN:  ==================== ====================  CCU MIDNIGHT ROUNDS  ====================    ARSENIO ANA  [id number]    ====================  SUMMARY: 54 year old male with gastritis presents with inferior lateral wall stemi, s/p GUILLERMO to mid circ.   ====================  ====================  NEW EVENTS: Patients chest pain self resolved. He experienced slight HA which resolved with tylenol. currently asymptomatic with no acute complaints   ====================  ====================  VITALS (Last 12 hrs):  ====================    T(C): 36.9 (01-16-20 @ 16:00), Max: 36.9 (01-16-20 @ 16:00)  HR: 84 (01-16-20 @ 20:00) (72 - 106)  BP: 103/69 (01-16-20 @ 20:00) (93/66 - 124/84)  BP(mean): 80 (01-16-20 @ 20:00) (73 - 99)  ABP: --  ABP(mean): --  RR: 17 (01-16-20 @ 10:00) (17 - 17)  SpO2: 95% (01-16-20 @ 20:00) (90% - 97%)  Wt(kg): --  CVP(mm Hg): --  CO: --  CI: --  PA: --  PA(mean): --  PA(direct): --  PCWP: --  SVR: --    TELEMETRY:        *BLOOD GAS/ARTERIAL/MIXED/VENOUS  *LACTATE    I&O's Summary    15 Kamran 2020 07:01  -  16 Jan 2020 07:00  --------------------------------------------------------  IN: 0 mL / OUT: 850 mL / NET: -850 mL    16 Jan 2020 07:01  -  16 Jan 2020 21:15  --------------------------------------------------------  IN: 480 mL / OUT: 750 mL / NET: -270 mL    ====================  PLAN:   54 year old male with gastritis presents with inferior lateral wall stemi, s/p GUILLERMO to mid circ.   - C/W DAPT   - C/w metoprolol 25 mg BID , consider introduction of ACE -I if BP tolerates tonight.   ====================

## 2020-01-16 NOTE — CHART NOTE - NSCHARTNOTEFT_GEN_A_CORE
Removal of Femoral Sheath    Pulses in the right lower extremity are palpable. The patient was placed in the supine position. The insertion site was identified and the sutures were removed per protocol.  The 6 [place] femoral sheath was then removed. Direct pressure was applied for 25 minutes.     Monitoring of the right groin and both lower extremities including neuro-vascular checks and vital signs every 15 minutes x 4, then every 30 minutes x 2, then every 1 hour was ordered.    Complications: None      Dominique Mccarty CCU NP  [phone]

## 2020-01-16 NOTE — GOALS OF CARE CONVERSATION - ADVANCED CARE PLANNING - CONVERSATION DETAILS
LCSW assisted with completing health care proxy form. Patient identified niece, Elizabeth Rush [phone], as primary health care proxy. Copy of health care proxy placed in chart.

## 2020-01-16 NOTE — PROGRESS NOTE ADULT - ASSESSMENT
54 year old male with PMHx for gastritis presents from [facility] with EKG findings consistent with inferior lateral wall stemi, s/p revascularization with 1 stent placed to the circumflex artery    #Neuro  -Aox3, no active issues     #Resp  Saturating 99% on RA     #CV  Inferolateral wallSTEMI   - Aneta criteria III/ III Substernal chest pain, worsened with exertion and relieved with rest   - EKG with evidence of ST elevations in II ,III, AVF and V4-V6, ST depressions in leads v1-v3  - Troponin I positive at [place] .501, will continue to trend cardiac enzymes to peak    - S/P LHC with findings c/w 100% stenosis to circumflex artery X 1 GUILLERMO   - Prior to LHC was ASA and plavix loaded at OSH, following LHC loaded with brillinta   - Will continue with DAPT w/ ASA and Brillinta   - Sheath pulled  - CP?;   - HD stable  - lipid panel WNL    #GI  Gastritis   - C/w home  pantoprazole     #ID  - Slight leukocytosis, likely reactive   - will continue to monitor  - No active signs of infection     #Renal  - No renal issues   - Will monitor with daily BMP, following contrast load from cath     #Heme  -No active issues     #Endo  no active issue  - A1C 5.2%  - TSH 3.27    #DVT PPx  - Hep SQ 54 year old male with PMHx for gastritis presents from [facility] with EKG findings consistent with inferior lateral wall stemi, s/p revascularization with 1 stent placed to the circumflex artery    #Neuro  -Aox3, no active issues     #Resp  Saturating 99% on RA     #CV  Inferolateral wallSTEMI   - Aneta criteria III/ III Substernal chest pain, worsened with exertion and relieved with rest   - EKG with evidence of ST elevations in II ,III, AVF and V4-V6, ST depressions in leads v1-v3  - Troponin I positive at [place] .501; both troponin and CKMB have peaked  - S/P LHC with findings c/w 100% stenosis to circumflex artery X 1 GUILLERMO   - Prior to LHC was ASA and plavix loaded at OSH, following LHC loaded with brillinta   - Will continue with DAPT w/ ASA and Brillinta   - Sheath pulled  - HD stable  - lipid panel WNL  - continue to monitor post-perfussion    #GI  Gastritis   - C/w home  pantoprazole     #ID  - Slight leukocytosis, likely reactive   - will continue to monitor  - No active signs of infection     #Renal  - No renal issues   - Will monitor with daily BMP, following contrast load from cath     #Heme  -No active issues     #Endo  no active issue  - A1C 5.2%  - TSH 3.27    #DVT PPx  - Hep SQ

## 2020-01-17 DIAGNOSIS — K29.70 GASTRITIS, UNSPECIFIED, WITHOUT BLEEDING: ICD-10-CM

## 2020-01-17 DIAGNOSIS — I21.3 ST ELEVATION (STEMI) MYOCARDIAL INFARCTION OF UNSPECIFIED SITE: ICD-10-CM

## 2020-01-17 LAB
ALBUMIN SERPL ELPH-MCNC: 4 G/DL — SIGNIFICANT CHANGE UP (ref 3.3–5)
ALP SERPL-CCNC: 79 U/L — SIGNIFICANT CHANGE UP (ref 40–120)
ALT FLD-CCNC: 61 U/L — HIGH (ref 10–45)
ANION GAP SERPL CALC-SCNC: 14 MMOL/L — SIGNIFICANT CHANGE UP (ref 5–17)
AST SERPL-CCNC: 157 U/L — HIGH (ref 10–40)
BASOPHILS # BLD AUTO: 0.03 K/UL — SIGNIFICANT CHANGE UP (ref 0–0.2)
BASOPHILS NFR BLD AUTO: 0.3 % — SIGNIFICANT CHANGE UP (ref 0–2)
BILIRUB SERPL-MCNC: 0.7 MG/DL — SIGNIFICANT CHANGE UP (ref 0.2–1.2)
BUN SERPL-MCNC: 12 MG/DL — SIGNIFICANT CHANGE UP (ref 7–23)
CALCIUM SERPL-MCNC: 8.7 MG/DL — SIGNIFICANT CHANGE UP (ref 8.4–10.5)
CHLORIDE SERPL-SCNC: 103 MMOL/L — SIGNIFICANT CHANGE UP (ref 96–108)
CO2 SERPL-SCNC: 20 MMOL/L — LOW (ref 22–31)
CREAT SERPL-MCNC: 0.93 MG/DL — SIGNIFICANT CHANGE UP (ref 0.5–1.3)
EOSINOPHIL # BLD AUTO: 0.08 K/UL — SIGNIFICANT CHANGE UP (ref 0–0.5)
EOSINOPHIL NFR BLD AUTO: 0.8 % — SIGNIFICANT CHANGE UP (ref 0–6)
GLUCOSE SERPL-MCNC: 119 MG/DL — HIGH (ref 70–99)
HCT VFR BLD CALC: 41.8 % — SIGNIFICANT CHANGE UP (ref 39–50)
HGB BLD-MCNC: 14.9 G/DL — SIGNIFICANT CHANGE UP (ref 13–17)
IMM GRANULOCYTES NFR BLD AUTO: 0.4 % — SIGNIFICANT CHANGE UP (ref 0–1.5)
LYMPHOCYTES # BLD AUTO: 2.85 K/UL — SIGNIFICANT CHANGE UP (ref 1–3.3)
LYMPHOCYTES # BLD AUTO: 29.8 % — SIGNIFICANT CHANGE UP (ref 13–44)
MAGNESIUM SERPL-MCNC: 2 MG/DL — SIGNIFICANT CHANGE UP (ref 1.6–2.6)
MCHC RBC-ENTMCNC: 29.7 PG — SIGNIFICANT CHANGE UP (ref 27–34)
MCHC RBC-ENTMCNC: 35.6 GM/DL — SIGNIFICANT CHANGE UP (ref 32–36)
MCV RBC AUTO: 83.3 FL — SIGNIFICANT CHANGE UP (ref 80–100)
MONOCYTES # BLD AUTO: 0.74 K/UL — SIGNIFICANT CHANGE UP (ref 0–0.9)
MONOCYTES NFR BLD AUTO: 7.7 % — SIGNIFICANT CHANGE UP (ref 2–14)
NEUTROPHILS # BLD AUTO: 5.81 K/UL — SIGNIFICANT CHANGE UP (ref 1.8–7.4)
NEUTROPHILS NFR BLD AUTO: 61 % — SIGNIFICANT CHANGE UP (ref 43–77)
NRBC # BLD: 0 /100 WBCS — SIGNIFICANT CHANGE UP (ref 0–0)
PHOSPHATE SERPL-MCNC: 2.9 MG/DL — SIGNIFICANT CHANGE UP (ref 2.5–4.5)
PLATELET # BLD AUTO: 245 K/UL — SIGNIFICANT CHANGE UP (ref 150–400)
POTASSIUM SERPL-MCNC: 3.8 MMOL/L — SIGNIFICANT CHANGE UP (ref 3.5–5.3)
POTASSIUM SERPL-SCNC: 3.8 MMOL/L — SIGNIFICANT CHANGE UP (ref 3.5–5.3)
PROT SERPL-MCNC: 7 G/DL — SIGNIFICANT CHANGE UP (ref 6–8.3)
RBC # BLD: 5.02 M/UL — SIGNIFICANT CHANGE UP (ref 4.2–5.8)
RBC # FLD: 12.8 % — SIGNIFICANT CHANGE UP (ref 10.3–14.5)
SODIUM SERPL-SCNC: 137 MMOL/L — SIGNIFICANT CHANGE UP (ref 135–145)
WBC # BLD: 9.55 K/UL — SIGNIFICANT CHANGE UP (ref 3.8–10.5)
WBC # FLD AUTO: 9.55 K/UL — SIGNIFICANT CHANGE UP (ref 3.8–10.5)

## 2020-01-17 PROCEDURE — 99223 1ST HOSP IP/OBS HIGH 75: CPT

## 2020-01-17 PROCEDURE — 99233 SBSQ HOSP IP/OBS HIGH 50: CPT

## 2020-01-17 RX ORDER — CAPTOPRIL 12.5 MG/1
6.25 TABLET ORAL EVERY 8 HOURS
Refills: 0 | Status: DISCONTINUED | OUTPATIENT
Start: 2020-01-17 | End: 2020-01-17

## 2020-01-17 RX ORDER — POTASSIUM CHLORIDE 20 MEQ
20 PACKET (EA) ORAL ONCE
Refills: 0 | Status: COMPLETED | OUTPATIENT
Start: 2020-01-17 | End: 2020-01-17

## 2020-01-17 RX ORDER — CAPTOPRIL 12.5 MG/1
6.25 TABLET ORAL THREE TIMES A DAY
Refills: 0 | Status: DISCONTINUED | OUTPATIENT
Start: 2020-01-17 | End: 2020-01-17

## 2020-01-17 RX ORDER — TICAGRELOR 90 MG/1
1 TABLET ORAL
Qty: 28 | Refills: 0
Start: 2020-01-17 | End: 2020-01-30

## 2020-01-17 RX ORDER — CAPTOPRIL 12.5 MG/1
6.25 TABLET ORAL EVERY 8 HOURS
Refills: 0 | Status: DISCONTINUED | OUTPATIENT
Start: 2020-01-17 | End: 2020-01-18

## 2020-01-17 RX ADMIN — HEPARIN SODIUM 5000 UNIT(S): 5000 INJECTION INTRAVENOUS; SUBCUTANEOUS at 16:37

## 2020-01-17 RX ADMIN — HEPARIN SODIUM 5000 UNIT(S): 5000 INJECTION INTRAVENOUS; SUBCUTANEOUS at 21:16

## 2020-01-17 RX ADMIN — CAPTOPRIL 6.25 MILLIGRAM(S): 12.5 TABLET ORAL at 21:16

## 2020-01-17 RX ADMIN — PANTOPRAZOLE SODIUM 40 MILLIGRAM(S): 20 TABLET, DELAYED RELEASE ORAL at 06:33

## 2020-01-17 RX ADMIN — CAPTOPRIL 6.25 MILLIGRAM(S): 12.5 TABLET ORAL at 16:38

## 2020-01-17 RX ADMIN — Medication 20 MILLIEQUIVALENT(S): at 06:33

## 2020-01-17 RX ADMIN — Medication 25 MILLIGRAM(S): at 17:42

## 2020-01-17 RX ADMIN — Medication 81 MILLIGRAM(S): at 12:25

## 2020-01-17 RX ADMIN — Medication 25 MILLIGRAM(S): at 06:33

## 2020-01-17 RX ADMIN — ATORVASTATIN CALCIUM 80 MILLIGRAM(S): 80 TABLET, FILM COATED ORAL at 21:16

## 2020-01-17 RX ADMIN — TICAGRELOR 90 MILLIGRAM(S): 90 TABLET ORAL at 06:33

## 2020-01-17 RX ADMIN — TICAGRELOR 90 MILLIGRAM(S): 90 TABLET ORAL at 17:42

## 2020-01-17 RX ADMIN — HEPARIN SODIUM 5000 UNIT(S): 5000 INJECTION INTRAVENOUS; SUBCUTANEOUS at 06:33

## 2020-01-17 RX ADMIN — CHLORHEXIDINE GLUCONATE 1 APPLICATION(S): 213 SOLUTION TOPICAL at 06:33

## 2020-01-17 NOTE — CHART NOTE - NSCHARTNOTEFT_GEN_A_CORE
CCU Transfer Note    Transfer from: CCU  Transfer to:  (  ) Medicine    (  ) Telemetry    (  ) RCU    (  ) Palliative    (  ) Stroke Unit    (  ) _______________  Accepting physican:    MEDICATIONS:  STANDING MEDICATIONS  aspirin  chewable 81 milliGRAM(s) Oral daily  atorvastatin 80 milliGRAM(s) Oral at bedtime  captopril 6.25 milliGRAM(s) Oral three times a day  chlorhexidine 2% Cloths 1 Application(s) Topical <User Schedule>  heparin  Injectable 5000 Unit(s) SubCutaneous every 8 hours  influenza   Vaccine 0.5 milliLiter(s) IntraMuscular once  metoprolol tartrate 25 milliGRAM(s) Oral two times a day  pantoprazole    Tablet 40 milliGRAM(s) Oral before breakfast  ticagrelor 90 milliGRAM(s) Oral every 12 hours    PRN MEDICATIONS  acetaminophen   Tablet .. 650 milliGRAM(s) Oral every 6 hours PRN    VITAL SIGNS: Last 24 Hours  T(C): 36.6 (17 Jan 2020 08:00), Max: 36.9 (16 Jan 2020 16:00)  T(F): 97.8 (17 Jan 2020 08:00), Max: 98.5 (16 Jan 2020 20:00)  HR: 86 (17 Jan 2020 09:00) (70 - 106)  BP: 116/80 (17 Jan 2020 09:00) (86/68 - 124/84)  BP(mean): 91 (17 Jan 2020 09:00) (66 - 99)  RR: 17 (17 Jan 2020 09:00) (14 - 20)  SpO2: 96% (17 Jan 2020 09:00) (90% - 97%)    LABS:                        14.9   9.55  )-----------( 245      ( 17 Jan 2020 04:55 )             41.8     01-17    137  |  103  |  12  ----------------------------<  119<H>  3.8   |  20<L>  |  0.93    Ca    8.7      17 Jan 2020 04:55  Phos  2.9     01-17  Mg     2.0     01-17    TPro  7.0  /  Alb  4.0  /  TBili  0.7  /  DBili  x   /  AST  157<H>  /  ALT  61<H>  /  AlkPhos  79  01-17    PT/INR - ( 15 Kamran 2020 20:40 )   PT: 11.4 sec;   INR: 1.01 ratio    PTT - ( 15 Kamran 2020 20:40 )  PTT:33.8 sec    CARDIAC MARKERS ( 16 Jan 2020 11:26 )  x     / x     / 2919 U/L / x     / 110.6 ng/mL  CARDIAC MARKERS ( 16 Jan 2020 04:54 )  x     / x     / 4248 U/L / x     / 172.9 ng/mL  CARDIAC MARKERS ( 15 Kamran 2020 23:21 )  x     / x     / 3758 U/L / x     / 123.0 ng/mL  CARDIAC MARKERS ( 15 Kamran 2020 20:41 )  .503 ng/mL / x     / x     / x     / x        RADIOLOGY:    HPI:  53 yo M PMHx gastritis initially presenting with chief complaint of chest pain radiating to the jaw and left arm admitted with STEMI. Patient states that he is employed as a , he noticed yesterday afternoon he experienced severe crushing chest pain 10/10 in severity radiating to his left arm. He was diaphoretic, nauseas, vomited X1 non bloody, non bilious emesis. He took Advil with little to no relief.  He states the pain was significantly reduced 2 hours later and he was able to rest in bed overnight. Today, however he noticed around 6 pm that he had significant 10/10 pain once more. He went to an outpatient clinic where he was found to have EKG changes and sent into the ED at [facility] EKG noted findings of inferolateral wall STEMI. Troponin I with .503, patient remained to be hemodynamically stable, saturating 99% on RA.  He was loaded with ASA and plavix at [place], was taken to cath lab, found to have 100% stenosis to the circumflex, now s/p DESx1 .      Vitals on triage: /91, Hr 62, RR 16,T 97.5,99 % room air (15 Kamran 2020 22:38)    CCU COURSE:  Pt has done well in the CCU.  Left-sided back pain from time to time relieved by tylenol.  Remains on ASA, Brilinta.  Was started on metroprolol and captopril.  Troponin and CKMB peaked.  TTE showed hypokinetic inferior and inferior-lateral wall with segmental LV systolic dysfunction.  EKG showed resolution of elevated T-waves in II, III, AVF, V5-6, though T-wave inversions remain.  Pt comfortable and appropriate for transfer to medicine on telemonitoring for the remainder of his care.      ASSESSMENT & PLAN:   54y M w/ hx of gastritis p/w inferior and lateral STEMI s/p DESx1 %.      #Neuro  -Aox3, no active issues     #Resp  Saturating 99% on RA     #CV  Inferolateral wall STEMI   - Aneta criteria III/ III Substernal chest pain, worsened with exertion and relieved with rest   - EKG with evidence of ST elevations in II ,III, AVF and V4-V6, ST depressions in leads v1-v3  - S/P LHC with findings c/w 100% stenosis to circumflex artery X 1 GUILLERMO   - ASA and plavix loaded at OSH, following LHC loaded with brillinta   - both troponin and CKMB have peaked  - Will continue with DAPT w/ ASA and Brillinta   - HD stable  - lipid panel WNL  - repeat TTE (1/16) showed hypokinesis of the inferior and inferior-lateral walls of the LV, w/ segmental LV systolic dysfunction  - c/w metoprolol  - start captopril  - continue to monitor post-reperfussion    #GI  Gastritis   - C/w home  pantoprazole     #ID  - Reactive leukocytosis on presentation now resolved   - No active signs of infection   - will continue to monitor    #Renal  - No renal issues   - Will monitor with daily BMP, following contrast load from cath     #Heme  -No active issues     #Endo  no active issue  - A1C 5.2%  - TSH 3.27    #DVT PPx  - Hep SQ      For Follow-Up:    [] continue monitoring for acute post-MI complications  [] establish care w/ outpt cardiologist (pt denied hx of heart dx and did not have one)  [] Please contact CCU if pt c/o severe CP, becomes HD unstable, or has dynamic EKG changes CCU Transfer Note    Transfer from: CCU  Transfer to:  (  ) Medicine    (x) Telemetry    (  ) RCU    (  ) Palliative    (  ) Stroke Unit    (  ) _______________  Accepting physican:    MEDICATIONS:  STANDING MEDICATIONS  aspirin  chewable 81 milliGRAM(s) Oral daily  atorvastatin 80 milliGRAM(s) Oral at bedtime  captopril 6.25 milliGRAM(s) Oral three times a day  chlorhexidine 2% Cloths 1 Application(s) Topical <User Schedule>  heparin  Injectable 5000 Unit(s) SubCutaneous every 8 hours  influenza   Vaccine 0.5 milliLiter(s) IntraMuscular once  metoprolol tartrate 25 milliGRAM(s) Oral two times a day  pantoprazole    Tablet 40 milliGRAM(s) Oral before breakfast  ticagrelor 90 milliGRAM(s) Oral every 12 hours    PRN MEDICATIONS  acetaminophen   Tablet .. 650 milliGRAM(s) Oral every 6 hours PRN    VITAL SIGNS: Last 24 Hours  T(C): 36.6 (17 Jan 2020 08:00), Max: 36.9 (16 Jan 2020 16:00)  T(F): 97.8 (17 Jan 2020 08:00), Max: 98.5 (16 Jan 2020 20:00)  HR: 86 (17 Jan 2020 09:00) (70 - 106)  BP: 116/80 (17 Jan 2020 09:00) (86/68 - 124/84)  BP(mean): 91 (17 Jan 2020 09:00) (66 - 99)  RR: 17 (17 Jan 2020 09:00) (14 - 20)  SpO2: 96% (17 Jan 2020 09:00) (90% - 97%)    LABS:                        14.9   9.55  )-----------( 245      ( 17 Jan 2020 04:55 )             41.8     01-17    137  |  103  |  12  ----------------------------<  119<H>  3.8   |  20<L>  |  0.93    Ca    8.7      17 Jan 2020 04:55  Phos  2.9     01-17  Mg     2.0     01-17    TPro  7.0  /  Alb  4.0  /  TBili  0.7  /  DBili  x   /  AST  157<H>  /  ALT  61<H>  /  AlkPhos  79  01-17    PT/INR - ( 15 Kamran 2020 20:40 )   PT: 11.4 sec;   INR: 1.01 ratio    PTT - ( 15 Kamran 2020 20:40 )  PTT:33.8 sec    CARDIAC MARKERS ( 16 Jan 2020 11:26 )  x     / x     / 2919 U/L / x     / 110.6 ng/mL  CARDIAC MARKERS ( 16 Jan 2020 04:54 )  x     / x     / 4248 U/L / x     / 172.9 ng/mL  CARDIAC MARKERS ( 15 Kamran 2020 23:21 )  x     / x     / 3758 U/L / x     / 123.0 ng/mL  CARDIAC MARKERS ( 15 Kamran 2020 20:41 )  .503 ng/mL / x     / x     / x     / x        RADIOLOGY:    HPI:  53 yo M PMHx gastritis initially presenting with chief complaint of chest pain radiating to the jaw and left arm admitted with STEMI. Patient states that he is employed as a , he noticed yesterday afternoon he experienced severe crushing chest pain 10/10 in severity radiating to his left arm. He was diaphoretic, nauseas, vomited X1 non bloody, non bilious emesis. He took Advil with little to no relief.  He states the pain was significantly reduced 2 hours later and he was able to rest in bed overnight. Today, however he noticed around 6 pm that he had significant 10/10 pain once more. He went to an outpatient clinic where he was found to have EKG changes and sent into the ED at [facility] EKG noted findings of inferolateral wall STEMI. Troponin I with .503, patient remained to be hemodynamically stable, saturating 99% on RA.  He was loaded with ASA and plavix at [place], was taken to cath lab, found to have 100% stenosis to the circumflex, now s/p DESx1 .      Vitals on triage: /91, Hr 62, RR 16,T 97.5,99 % room air (15 Kamran 2020 22:38)    CCU COURSE:  Pt has done well in the CCU.  Left-sided back pain from time to time relieved by tylenol.  Remains on ASA, Brilinta.  Was started on metroprolol and captopril.  Troponin and CKMB peaked.  TTE showed hypokinetic inferior and inferior-lateral wall with segmental LV systolic dysfunction.  EKG showed resolution of elevated T-waves in II, III, AVF, V5-6, though T-wave inversions remain.  Pt comfortable and appropriate for transfer to medicine on telemonitoring for the remainder of his care.      ASSESSMENT & PLAN:   54y M w/ hx of gastritis p/w inferior and lateral STEMI s/p DESx1 %.      #Neuro  -Aox3, no active issues     #Resp  Saturating 99% on RA     #CV  Inferolateral wall STEMI   - Aneta criteria III/ III Substernal chest pain, worsened with exertion and relieved with rest   - EKG with evidence of ST elevations in II ,III, AVF and V4-V6, ST depressions in leads v1-v3  - S/P LHC with findings c/w 100% stenosis to circumflex artery X 1 GUILLERMO   - ASA and plavix loaded at OSH, following LHC loaded with brillinta   - both troponin and CKMB have peaked  - Will continue with DAPT w/ ASA and Brillinta   - HD stable  - lipid panel WNL  - repeat TTE (1/16) showed hypokinesis of the inferior and inferior-lateral walls of the LV, w/ segmental LV systolic dysfunction  - c/w metoprolol  - start captopril  - continue to monitor post-reperfussion    #GI  Gastritis   - C/w home  pantoprazole     #ID  - Reactive leukocytosis on presentation now resolved   - No active signs of infection   - will continue to monitor    #Renal  - No renal issues   - Will monitor with daily BMP, following contrast load from cath     #Heme  -No active issues     #Endo  no active issue  - A1C 5.2%  - TSH 3.27    #DVT PPx  - Hep SQ      For Follow-Up:    [] continue monitoring for acute post-MI complications  [] establish care w/ outpt cardiologist (pt denied hx of heart dx and did not have one)  [] Please contact CCU if pt c/o severe CP, becomes HD unstable, or has dynamic EKG changes CCU Transfer Note    Transfer from: CCU  Transfer to:  (  ) Medicine    (x) Telemetry    (  ) RCU    (  ) Palliative    (  ) Stroke Unit    (  ) _______________  Accepting physician: Dr. Lorin Skinner    MEDICATIONS:  STANDING MEDICATIONS  aspirin  chewable 81 milliGRAM(s) Oral daily  atorvastatin 80 milliGRAM(s) Oral at bedtime  captopril 6.25 milliGRAM(s) Oral three times a day  chlorhexidine 2% Cloths 1 Application(s) Topical <User Schedule>  heparin  Injectable 5000 Unit(s) SubCutaneous every 8 hours  influenza   Vaccine 0.5 milliLiter(s) IntraMuscular once  metoprolol tartrate 25 milliGRAM(s) Oral two times a day  pantoprazole    Tablet 40 milliGRAM(s) Oral before breakfast  ticagrelor 90 milliGRAM(s) Oral every 12 hours    PRN MEDICATIONS  acetaminophen   Tablet .. 650 milliGRAM(s) Oral every 6 hours PRN    VITAL SIGNS: Last 24 Hours  T(C): 36.6 (17 Jan 2020 08:00), Max: 36.9 (16 Jan 2020 16:00)  T(F): 97.8 (17 Jan 2020 08:00), Max: 98.5 (16 Jan 2020 20:00)  HR: 86 (17 Jan 2020 09:00) (70 - 106)  BP: 116/80 (17 Jan 2020 09:00) (86/68 - 124/84)  BP(mean): 91 (17 Jan 2020 09:00) (66 - 99)  RR: 17 (17 Jan 2020 09:00) (14 - 20)  SpO2: 96% (17 Jan 2020 09:00) (90% - 97%)    LABS:                        14.9   9.55  )-----------( 245      ( 17 Jan 2020 04:55 )             41.8     01-17    137  |  103  |  12  ----------------------------<  119<H>  3.8   |  20<L>  |  0.93    Ca    8.7      17 Jan 2020 04:55  Phos  2.9     01-17  Mg     2.0     01-17    TPro  7.0  /  Alb  4.0  /  TBili  0.7  /  DBili  x   /  AST  157<H>  /  ALT  61<H>  /  AlkPhos  79  01-17    PT/INR - ( 15 Kamran 2020 20:40 )   PT: 11.4 sec;   INR: 1.01 ratio    PTT - ( 15 Kamran 2020 20:40 )  PTT:33.8 sec    CARDIAC MARKERS ( 16 Jan 2020 11:26 )  x     / x     / 2919 U/L / x     / 110.6 ng/mL  CARDIAC MARKERS ( 16 Jan 2020 04:54 )  x     / x     / 4248 U/L / x     / 172.9 ng/mL  CARDIAC MARKERS ( 15 Kamran 2020 23:21 )  x     / x     / 3758 U/L / x     / 123.0 ng/mL  CARDIAC MARKERS ( 15 Kamran 2020 20:41 )  .503 ng/mL / x     / x     / x     / x        RADIOLOGY:    HPI:  55 yo M PMHx gastritis initially presenting with chief complaint of chest pain radiating to the jaw and left arm admitted with STEMI. Patient states that he is employed as a , he noticed yesterday afternoon he experienced severe crushing chest pain 10/10 in severity radiating to his left arm. He was diaphoretic, nauseas, vomited X1 non bloody, non bilious emesis. He took Advil with little to no relief.  He states the pain was significantly reduced 2 hours later and he was able to rest in bed overnight. Today, however he noticed around 6 pm that he had significant 10/10 pain once more. He went to an outpatient clinic where he was found to have EKG changes and sent into the ED at [facility] EKG noted findings of inferolateral wall STEMI. Troponin I with .503, patient remained to be hemodynamically stable, saturating 99% on RA.  He was loaded with ASA and plavix at [place], was taken to cath lab, found to have 100% stenosis to the circumflex, now s/p DESx1 .      Vitals on triage: /91, Hr 62, RR 16,T 97.5,99 % room air (15 Kamran 2020 22:38)    CCU COURSE:  Pt has done well in the CCU.  Left-sided back pain from time to time relieved by tylenol.  Remains on ASA, Brilinta.  Was started on metroprolol and captopril.  Troponin and CKMB peaked.  TTE showed hypokinetic inferior and inferior-lateral wall with segmental LV systolic dysfunction.  EKG showed resolution of elevated T-waves in II, III, AVF, V5-6, though T-wave inversions remain.  Pt comfortable and appropriate for transfer to medicine on telemonitoring for the remainder of his care.      ASSESSMENT & PLAN:   54y M w/ hx of gastritis p/w inferior and lateral STEMI s/p DESx1 %.      #Neuro  -Aox3, no active issues     #Resp  Saturating 99% on RA     #CV  Inferolateral wall STEMI   - Aneta criteria III/ III Substernal chest pain, worsened with exertion and relieved with rest   - EKG with evidence of ST elevations in II ,III, AVF and V4-V6, ST depressions in leads v1-v3  - S/P LHC with findings c/w 100% stenosis to circumflex artery X 1 GUILLERMO   - ASA and plavix loaded at OSH, following LHC loaded with brillinta   - both troponin and CKMB have peaked  - Will continue with DAPT w/ ASA and Brillinta   - HD stable  - lipid panel WNL  - repeat TTE (1/16) showed hypokinesis of the inferior and inferior-lateral walls of the LV, w/ segmental LV systolic dysfunction  - c/w metoprolol  - start captopril  - continue to monitor post-reperfussion    #GI  Gastritis   - C/w home  pantoprazole     #ID  - Reactive leukocytosis on presentation now resolved   - No active signs of infection   - will continue to monitor    #Renal  - No renal issues   - Will monitor with daily BMP, following contrast load from cath     #Heme  -No active issues     #Endo  no active issue  - A1C 5.2%  - TSH 3.27    #DVT PPx  - Hep SQ      For Follow-Up:    [] continue monitoring for acute post-MI complications  [] establish care w/ outpt cardiologist (pt denied hx of heart dx and did not have one)  [] Please contact CCU if pt c/o severe CP, becomes HD unstable, or has dynamic EKG changes

## 2020-01-17 NOTE — DIETITIAN INITIAL EVALUATION ADULT. - PHYSICAL APPEARANCE
other (specify)/obese Ht:   67"  Wt: 197lbs    IBW:  148lbs +/-10%    BMI: 148lbs  kg/m2    %IBW: 133%  Skin: Intact, no pressure injuries per nurse's flow sheets  Edema: None noted per nurse's flow sheets

## 2020-01-17 NOTE — DIETITIAN INITIAL EVALUATION ADULT. - REASON INDICATOR FOR ASSESSMENT
Pt seen for length of stay, information obtained pt and EMR. Pt is primarily [place] speaking, used  phone ID# [phone]    Per chart, 54 year old male admitted on (01-15) with PMH for gastritis presents from [facility] with EKG findings consistent with inferior lateral wall STEMI, s/p 1 GUILLERMO in the circumflex artery.
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
## 2020-01-17 NOTE — PROGRESS NOTE ADULT - SUBJECTIVE AND OBJECTIVE BOX
Frantz Nieves, PGY1  Cardiac Care Unit  Internal Medicine  Pager: [phone] ([facility]) / [phone] ([facility])    PATIENT: ARSENIO PEREZ, MRN: [id number]    CHIEF COMPLAINT: Patient is a 54y old  Male who presents with a chief complaint of STEMI (2020 07:33)      INTERVAL HISTORY/OVERNIGHT EVENTS: No acute event o/n; no complaint from pt.  Resting comfortably, eating well.  Mentions that he has occasional left sided back pain, but not currently.  Denies changes in vision, lightheadedness, HA, racing heart, skipped beats, SOB, difficulty breathing, cough, wheezing, abd pain, N/V/D/C, dysuria, leg swelling    REVIEW OF SYSTEMS: as above    MEDICATIONS:  MEDICATIONS  (STANDING):  aspirin  chewable 81 milliGRAM(s) Oral daily  atorvastatin 80 milliGRAM(s) Oral at bedtime  chlorhexidine 2% Cloths 1 Application(s) Topical <User Schedule>  heparin  Injectable 5000 Unit(s) SubCutaneous every 8 hours  influenza   Vaccine 0.5 milliLiter(s) IntraMuscular once  metoprolol tartrate 25 milliGRAM(s) Oral two times a day  pantoprazole    Tablet 40 milliGRAM(s) Oral before breakfast  ticagrelor 90 milliGRAM(s) Oral every 12 hours    MEDICATIONS  (PRN):  acetaminophen   Tablet .. 650 milliGRAM(s) Oral every 6 hours PRN Severe Pain (7 - 10)      ALLERGIES: Allergies    No Known Allergies    Intolerances    OBJECTIVE:  ICU Vital Signs Last 24 Hrs  T(C): 36.6 (2020 06:00), Max: 36.9 (2020 16:00)  T(F): 97.9 (2020 06:00), Max: 98.5 (2020 20:00)  HR: 72 (2020 06:00) (70 - 106)  BP: 103/74 (2020 06:00) (86/68 - 128/71)  BP(mean): 82 (2020 06:00) (73 - 99)  ABP: --  ABP(mean): --  RR: 18 (2020 06:00) (14 - 20)  SpO2: 95% (2020 06:00) (90% - 97%)    I&O's Summary    2020 07:01  -  2020 07:00  --------------------------------------------------------  IN: 720 mL / OUT: 900 mL / NET: -180 mL    Daily Height in cm: 170.18 (2020 06:00)    Daily Weight in k.7 (2020 06:00)    PHYSICAL EXAMINATION:  GEN: resting in bed, comfortable, face mildly flushed, NAD  HEENT: PERRL; mucous moist; neck supple; neg JVD  CV: RRR, S1, S2; no M/R/G  PULM: LCTAB; not using accessory muscles  ABD: normal bowel sounds; non-distended, soft; non-tender; no rebound, no guarding  BACK: no CVA tenderness; no spinal tenderness  Extremities: no edema; DP and radial pulses palpable  NEURO: no FND  Incisions:   Tubes:    LABS:                          14.9   9.55  )-----------( 245      ( 2020 04:55 )             41.8         137  |  103  |  12  ----------------------------<  119<H>  3.8   |  20<L>  |  0.93    Ca    8.7      2020 04:55  Phos  2.9       Mg     2.0         TPro  7.0  /  Alb  4.0  /  TBili  0.7  /  DBili  x   /  AST  157<H>  /  ALT  61<H>  /  AlkPhos  79  17    LIVER FUNCTIONS - ( 2020 04:55 )  Alb: 4.0 g/dL / Pro: 7.0 g/dL / ALK PHOS: 79 U/L / ALT: 61 U/L / AST: 157 U/L / GGT: x           PT/INR - ( 15 Kamran 2020 20:40 )   PT: 11.4 sec;   INR: 1.01 ratio         PTT - ( 15 Kamran 2020 20:40 )  PTT:33.8 sec  CKMB Units: 110.6 ng/mL ( @ 11:26)  Creatine Kinase, Serum: 2919 U/L ( @ 11:26)    CARDIAC MARKERS ( 2020 11:26 )  x     / x     / 2919 U/L / x     / 110.6 ng/mL  CARDIAC MARKERS ( 2020 04:54 )  x     / x     / 4248 U/L / x     / 172.9 ng/mL  CARDIAC MARKERS ( 15 Kamran 2020 23:21 )  x     / x     / 3758 U/L / x     / 123.0 ng/mL  CARDIAC MARKERS ( 15 Kamran 2020 20:41 )  .503 ng/mL / x     / x     / x     / x        TELEMETRY: NSR w/ occasional PVCs, many artifacts    EKG: last EKG from 115 PM showed resolution of elevated T-waves, which continue to be inverted in II, III, AVL, V5-6; otherwise sinus rhythm    IMAGING:   < from: TTE with Doppler (w/Cont) (20 @ 11:10) >  Dimensions:    Normal Values:  LA:     3.7    2.0 - 4.0 cm  Ao:     3.2    2.0 - 3.8 cm  SEPTUM: 1.0    0.6 - 1.2 cm  PWT:    0.9    0.6 - 1.1 cm  LVIDd:  4.2    3.0 - 5.6 cm  LVIDs:  3.0    1.8 - 4.0 cm  Derived variables:  LVMI: 61 g/m2  RWT: 0.42  Fractional short: 29 %  EF (Visual Estimate): 45-50 %  Doppler Peak Velocity (m/sec): AoV=1.2  ------------------------------------------------------------------------  Observations:  Mitral Valve: Normal mitral valve. Minimal mitral  regurgitation.  Aortic Valve/Aorta: Normal trileaflet aortic valve. Peak  transaortic valve gradient equals 6 mm Hg, mean transaortic  valve gradient equals 2 mm Hg, aortic valve velocity time  integral equals 23 cm. Peak left ventricular outflow tract  gradient equals 3 mm Hg, mean gradient is equal to 2 mm Hg,  LVOT velocity time integral equals 22 cm.  Aortic Root: 3.2 cm.  Left Atrium: Normal left atrium.  LA volume index = 23  cc/m2.  Left Ventricle: Mild segmental left ventricular systolic  dysfunction. Endocardial visualization enhanced with  intravenous injection of Ultrasonic Enhancing Agent  (Definity). The inferolateral wall, and the inferior wall  are hypokinetic.  Normal left ventricular internal  dimensions and wall thicknesses.  Right Heart: Normal right atrium. Normal right ventricular  size and function. Normal tricuspid valve. Mild-moderate  tricuspid regurgitation. Normal pulmonic valve.  Pericardium/Pleura: Normal pericardium with no pericardial  effusion.  Hemodynamic: Estimated right atrial pressure is 8 mm Hg.  Estimated right ventricular systolic pressure equals 35 mm  Hg, assuming right atrial pressure equals 8 mm Hg,  consistent with borderline pulmonary hypertension.  ------------------------------------------------------------------------  Conclusions:  1. Mild segmental left ventricular systolic dysfunction.  Endocardial visualization enhanced withintravenous  injection of Ultrasonic Enhancing Agent (Definity). The  inferolateral wall, and the inferior wall are hypokinetic.  2. Normal right ventricular size and function.  3. Normal tricuspid valve. Mild-moderate tricuspid  regurgitation.    < end of copied text >

## 2020-01-17 NOTE — DIETITIAN INITIAL EVALUATION ADULT. - ADD RECOMMEND
1. Continue DASH/TLC diet order as tolerated 2. Nutrition education provided, reinforce as needed 3. Monitor PO intake/tolerance, weights, labs, hydration status, bowels, and skin integrity 4. RD to remain available

## 2020-01-17 NOTE — PROGRESS NOTE ADULT - SUBJECTIVE AND OBJECTIVE BOX
SUBJ:  Transferred out of the cardiac ICU to the general floor. Admitted with inferolateral wall STEMI s/pLHC with 100% pLCx stenosis s/p GUILLERMO. ECHO with EF 45-50% and noted wall motion abnormalities in the same territory. No chest pain or shortness of breath.     Home Medications:  Nexium 20 mg oral powder for reconstitution, delayed release: 1 each orally once a day (15 Kamran 2020 22:52)    MEDICATIONS  (STANDING):  aspirin  chewable 81 milliGRAM(s) Oral daily  atorvastatin 80 milliGRAM(s) Oral at bedtime  captopril 6.25 milliGRAM(s) Oral every 8 hours  heparin  Injectable 5000 Unit(s) SubCutaneous every 8 hours  influenza   Vaccine 0.5 milliLiter(s) IntraMuscular once  metoprolol tartrate 25 milliGRAM(s) Oral two times a day  pantoprazole    Tablet 40 milliGRAM(s) Oral before breakfast  ticagrelor 90 milliGRAM(s) Oral every 12 hours    MEDICATIONS  (PRN):  acetaminophen   Tablet .. 650 milliGRAM(s) Oral every 6 hours PRN Severe Pain (7 - 10)  sodium chloride 0.65% Nasal 1 Spray(s) Both Nostrils every 6 hours PRN Nasal Congestion    Vital Signs Last 24 Hrs  T(C): 36.4 (17 Jan 2020 20:46), Max: 36.6 (17 Jan 2020 06:00)  T(F): 97.5 (17 Jan 2020 20:46), Max: 97.9 (17 Jan 2020 06:00)  HR: 92 (17 Jan 2020 20:46) (72 - 92)  BP: 134/91 (17 Jan 2020 20:46) (84/62 - 134/91)  BP(mean): 87 (17 Jan 2020 13:00) (66 - 91)  RR: 18 (17 Jan 2020 20:46) (16 - 20)  SpO2: 96% (17 Jan 2020 20:46) (95% - 99%)    REVIEW OF SYSTEMS:  As per HPI, otherwise unremarkable.     PHYSICAL EXAM:  · CONSTITUTIONAL: Well-developed, well nourished      · EYES: no drainage or redness  · NECK: supple.   ·RESPIRATORY:  airway patent; breath sounds equal; good air movement; respirations non-labored; clear to auscultation bilaterally.  · CARDIOVASCULAR: regular rate and rhythm.  . GASTROINTESTINAL:  no distention; no masses palpable.  · EXTREMITIES: No cyanosis, clubbing .  · VASCULAR:  Equal and normal pulses (radial)  ·NEUROLOGICAL:  Alert and oriented x 3  · SKIN: No lesions; no rash  . LYMPH NODES: No lymphadedenopathy  · MUSCULOSKELETAL:  No calf tenderness  no joint swelling	    LABS:  CBC Full  -  ( 17 Jan 2020 04:55 )  WBC Count : 9.55 K/uL  RBC Count : 5.02 M/uL  Hemoglobin : 14.9 g/dL  Hematocrit : 41.8 %  Platelet Count - Automated : 245 K/uL  Mean Cell Volume : 83.3 fl  Mean Cell Hemoglobin : 29.7 pg  Mean Cell Hemoglobin Concentration : 35.6 gm/dL  Auto Neutrophil # : 5.81 K/uL  Auto Lymphocyte # : 2.85 K/uL  Auto Monocyte # : 0.74 K/uL  Auto Eosinophil # : 0.08 K/uL  Auto Basophil # : 0.03 K/uL  Auto Neutrophil % : 61.0 %  Auto Lymphocyte % : 29.8 %  Auto Monocyte % : 7.7 %  Auto Eosinophil % : 0.8 %  Auto Basophil % : 0.3 %    01-17    137  |  103  |  12  ----------------------------<  119<H>  3.8   |  20<L>  |  0.93    Ca    8.7      17 Jan 2020 04:55  Phos  2.9     01-17  Mg     2.0     01-17    TPro  7.0  /  Alb  4.0  /  TBili  0.7  /  DBili  x   /  AST  157<H>  /  ALT  61<H>  /  AlkPhos  79  01-17    CARDIAC MARKERS ( 16 Jan 2020 11:26 )  x     / x     / 2919 U/L / x     / 110.6 ng/mL    ECHO 1/17/2020  Conclusions:  1. Mild segmental left ventricular systolic dysfunction.  Endocardial visualization enhanced with intravenous  injection of Ultrasonic Enhancing Agent (Definity). The  inferolateral wall, and the inferior wall are hypokinetic.  2. Normal right ventricular size and function.  3. Normal tricuspid valve. Mild-moderate tricuspid  regurgitation.    IMPRESSION AND PLAN:  54y M w/ hx of gastritis p/w inferior and lateral STEMI s/p DESx1 %.      1) CAD   s/p GUILLERMO x1 LCx  ECHO with hypokinesis of inferior and lateral wall; EF 45-50%.   Asympatomtic currently.    ·	Continue medical management with aspirin 81 mg daily, ticagrelor 90 mg BID, atorvastatin 80 mg nightly.   ·	Transition captopril to lisinopril 5-10 mg daily. (Captopril used in the ICU setting due to its short half life).   ·	Increase metoprolol succinate to 50 mg daily.   ·	Strongly encourage 30 minutes of daily moderate-intensity exercise equivalent to 3 mi/hr brisk walking as well as a heart healthy diet such as a vegan or Mediterranean diet.     Acceptable for discharge Saturday with the above medication changes. Please call with questions.     Christine King MD, MPH, ANTONIA, RPVI, FACC  Cardiovascular Specialist Attending  [facility], [place]  C: [phone]   E: [email or web address]

## 2020-01-17 NOTE — DIETITIAN INITIAL EVALUATION ADULT. - OTHER INFO
Diet PTA: Pt reports good appetite and PO intake. Eats 3 meals a day with snacks in between. Does not follow dietary restrictions at home. Denies use of oral nutrition supplements, vitamins, or minerals. Confirms NKFA. No Adventist or cultural food preferences. Denies difficulties chewing or swallowing.     Weights: Pt's reports UBW 200lbs x 1 year. A year ago he weighed 180lbs. Pt attributed wt gain to late night snacking. Current wt 197lbs; bed (01-17). Reported UBW consistent with in house wt.     Pt reports in house appetite and PO intake is good. Dietetic intern observed pt ate 100% of his lunch. No c/o N+V, constipation or diarrhea. Last BM (01-15)    Diet Education: Provided heart healthy nutrition recommendations. Discussed importance of choosing heart healthy fats and lean protein. Discussed and provided sources for both. Emphasized importance of avoiding high sodium foods such as canned products and processed sweets and snacks. Encouraged pt to consume foods high in fiber, provided sources. Pt was receptive and demonstrated understanding. Written materials left at bedside with pt. Diet PTA: Pt reports good appetite and PO intake. Eats 3 meals a day with snacks in between. Does not follow dietary restrictions at home. Mostly eats out; chinese food, pizza, sandwiches. Denies use of oral nutrition supplements, vitamins, or minerals. Confirms NKFA. No Congregational or cultural food preferences. Denies difficulties chewing or swallowing.     Weights: Pt's reports UBW 200lbs x 1 year. A year ago he weighed 180lbs. Pt attributed wt gain to late night snacking. Current wt 197lbs; bed (01-17). Reported UBW consistent with in house wt.     Pt reports in house appetite and PO intake is good. Dietetic intern observed pt ate 100% of his lunch. No c/o N+V, constipation or diarrhea. Last BM (01-15)    Diet Education: Provided heart healthy nutrition recommendations. Advised pt on how to make healthier choices when eating out. Discussed importance of choosing heart healthy fats and lean protein. Discussed and provided sources for both. Emphasized importance of avoiding high sodium foods such as canned products and processed sweets and snacks. Encouraged pt to consume foods high in fiber, provided sources. Pt was receptive and demonstrated understanding. Written materials left at bedside with pt.

## 2020-01-17 NOTE — PROGRESS NOTE ADULT - ASSESSMENT
54 year old male with PMHx for gastritis presents from [facility] with EKG findings consistent with inferior lateral wall STEMI, s/p 1 GUILLERMO in the circumflex artery    #Neuro  -Aox3, no active issues     #Resp  Saturating 99% on RA     #CV  Inferolateral wallSTEMI   - Aneta criteria III/ III Substernal chest pain, worsened with exertion and relieved with rest   - EKG with evidence of ST elevations in II ,III, AVF and V4-V6, ST depressions in leads v1-v3  - Troponin I positive at [place] .501  - S/P LHC with findings c/w 100% stenosis to circumflex artery X 1 GUILLERMO   - Prior to LHC was ASA and plavix loaded at OSH, following LHC loaded with brillinta   - both troponin and CKMB have peaked  - Will continue with DAPT w/ ASA and Brillinta   - HD stable  - lipid panel WNL  - repeat TTE (1/16) showed hypokinesis of the inferior and inferior-lateral walls of the LV, w/ segmental LV systolic dysfunction  - continue to monitor post-perfussion    #GI  Gastritis   - C/w home  pantoprazole     #ID  - Reactive leukocytosis on presentation now resolved   - No active signs of infection   - will continue to monitor    #Renal  - No renal issues   - Will monitor with daily BMP, following contrast load from cath     #Heme  -No active issues     #Endo  no active issue  - A1C 5.2%  - TSH 3.27    #DVT PPx  - Hep SQ 54 year old male with PMHx for gastritis presents from [facility] with EKG findings consistent with inferior lateral wall STEMI, s/p 1 GUILLERMO in the circumflex artery    #Neuro  -Aox3, no active issues     #Resp  Saturating 99% on RA     #CV  Inferolateral wallSTEMI   - Aneta criteria III/ III Substernal chest pain, worsened with exertion and relieved with rest   - EKG with evidence of ST elevations in II ,III, AVF and V4-V6, ST depressions in leads v1-v3  - Troponin I positive at [place] .501  - S/P LHC with findings c/w 100% stenosis to circumflex artery X 1 GUILLERMO   - Prior to LHC was ASA and plavix loaded at OSH, following LHC loaded with brillinta   - both troponin and CKMB have peaked  - Will continue with DAPT w/ ASA and Brillinta   - HD stable  - lipid panel WNL  - repeat TTE (1/16) showed hypokinesis of the inferior and inferior-lateral walls of the LV, w/ segmental LV systolic dysfunction  - c/w metoprolol  - start captopril  - continue to monitor post-reperfussion    #GI  Gastritis   - C/w home  pantoprazole     #ID  - Reactive leukocytosis on presentation now resolved   - No active signs of infection   - will continue to monitor    #Renal  - No renal issues   - Will monitor with daily BMP, following contrast load from cath     #Heme  -No active issues     #Endo  no active issue  - A1C 5.2%  - TSH 3.27    #DVT PPx  - Hep SQ

## 2020-01-17 NOTE — PROGRESS NOTE ADULT - ASSESSMENT
54 year old male with PMHx for gastritis presented with inferior lateral wall STEMI, s/p 1 GUILLERMO to the circumflex artery.

## 2020-01-18 ENCOUNTER — TRANSCRIPTION ENCOUNTER (OUTPATIENT)
Age: 55
End: 2020-01-18

## 2020-01-18 VITALS
OXYGEN SATURATION: 95 % | RESPIRATION RATE: 18 BRPM | SYSTOLIC BLOOD PRESSURE: 105 MMHG | DIASTOLIC BLOOD PRESSURE: 69 MMHG | HEART RATE: 82 BPM | TEMPERATURE: 98 F

## 2020-01-18 DIAGNOSIS — I51.9 HEART DISEASE, UNSPECIFIED: ICD-10-CM

## 2020-01-18 LAB
ALBUMIN SERPL ELPH-MCNC: 4.4 G/DL — SIGNIFICANT CHANGE UP (ref 3.3–5)
ALP SERPL-CCNC: 87 U/L — SIGNIFICANT CHANGE UP (ref 40–120)
ALT FLD-CCNC: 50 U/L — HIGH (ref 10–45)
ANION GAP SERPL CALC-SCNC: 18 MMOL/L — HIGH (ref 5–17)
AST SERPL-CCNC: 85 U/L — HIGH (ref 10–40)
BASOPHILS # BLD AUTO: 0.03 K/UL — SIGNIFICANT CHANGE UP (ref 0–0.2)
BASOPHILS NFR BLD AUTO: 0.3 % — SIGNIFICANT CHANGE UP (ref 0–2)
BILIRUB SERPL-MCNC: 0.6 MG/DL — SIGNIFICANT CHANGE UP (ref 0.2–1.2)
BUN SERPL-MCNC: 16 MG/DL — SIGNIFICANT CHANGE UP (ref 7–23)
CALCIUM SERPL-MCNC: 9.3 MG/DL — SIGNIFICANT CHANGE UP (ref 8.4–10.5)
CHLORIDE SERPL-SCNC: 102 MMOL/L — SIGNIFICANT CHANGE UP (ref 96–108)
CO2 SERPL-SCNC: 19 MMOL/L — LOW (ref 22–31)
CREAT SERPL-MCNC: 1.01 MG/DL — SIGNIFICANT CHANGE UP (ref 0.5–1.3)
EOSINOPHIL # BLD AUTO: 0.08 K/UL — SIGNIFICANT CHANGE UP (ref 0–0.5)
EOSINOPHIL NFR BLD AUTO: 0.8 % — SIGNIFICANT CHANGE UP (ref 0–6)
GLUCOSE SERPL-MCNC: 124 MG/DL — HIGH (ref 70–99)
HCT VFR BLD CALC: 45.1 % — SIGNIFICANT CHANGE UP (ref 39–50)
HGB BLD-MCNC: 15.2 G/DL — SIGNIFICANT CHANGE UP (ref 13–17)
IMM GRANULOCYTES NFR BLD AUTO: 0.5 % — SIGNIFICANT CHANGE UP (ref 0–1.5)
LYMPHOCYTES # BLD AUTO: 2.89 K/UL — SIGNIFICANT CHANGE UP (ref 1–3.3)
LYMPHOCYTES # BLD AUTO: 27.4 % — SIGNIFICANT CHANGE UP (ref 13–44)
MCHC RBC-ENTMCNC: 28.7 PG — SIGNIFICANT CHANGE UP (ref 27–34)
MCHC RBC-ENTMCNC: 33.7 GM/DL — SIGNIFICANT CHANGE UP (ref 32–36)
MCV RBC AUTO: 85.3 FL — SIGNIFICANT CHANGE UP (ref 80–100)
MONOCYTES # BLD AUTO: 0.72 K/UL — SIGNIFICANT CHANGE UP (ref 0–0.9)
MONOCYTES NFR BLD AUTO: 6.8 % — SIGNIFICANT CHANGE UP (ref 2–14)
NEUTROPHILS # BLD AUTO: 6.77 K/UL — SIGNIFICANT CHANGE UP (ref 1.8–7.4)
NEUTROPHILS NFR BLD AUTO: 64.2 % — SIGNIFICANT CHANGE UP (ref 43–77)
NRBC # BLD: 0 /100 WBCS — SIGNIFICANT CHANGE UP (ref 0–0)
PLATELET # BLD AUTO: 292 K/UL — SIGNIFICANT CHANGE UP (ref 150–400)
POTASSIUM SERPL-MCNC: 3.8 MMOL/L — SIGNIFICANT CHANGE UP (ref 3.5–5.3)
POTASSIUM SERPL-SCNC: 3.8 MMOL/L — SIGNIFICANT CHANGE UP (ref 3.5–5.3)
PROT SERPL-MCNC: 7.6 G/DL — SIGNIFICANT CHANGE UP (ref 6–8.3)
RBC # BLD: 5.29 M/UL — SIGNIFICANT CHANGE UP (ref 4.2–5.8)
RBC # FLD: 12.5 % — SIGNIFICANT CHANGE UP (ref 10.3–14.5)
SODIUM SERPL-SCNC: 139 MMOL/L — SIGNIFICANT CHANGE UP (ref 135–145)
WBC # BLD: 10.54 K/UL — HIGH (ref 3.8–10.5)
WBC # FLD AUTO: 10.54 K/UL — HIGH (ref 3.8–10.5)

## 2020-01-18 PROCEDURE — 84484 ASSAY OF TROPONIN QUANT: CPT

## 2020-01-18 PROCEDURE — 93005 ELECTROCARDIOGRAM TRACING: CPT

## 2020-01-18 PROCEDURE — 84100 ASSAY OF PHOSPHORUS: CPT

## 2020-01-18 PROCEDURE — 82550 ASSAY OF CK (CPK): CPT

## 2020-01-18 PROCEDURE — 84443 ASSAY THYROID STIM HORMONE: CPT

## 2020-01-18 PROCEDURE — 99239 HOSP IP/OBS DSCHRG MGMT >30: CPT

## 2020-01-18 PROCEDURE — C8929: CPT

## 2020-01-18 PROCEDURE — 83036 HEMOGLOBIN GLYCOSYLATED A1C: CPT

## 2020-01-18 PROCEDURE — 80053 COMPREHEN METABOLIC PANEL: CPT

## 2020-01-18 PROCEDURE — 83880 ASSAY OF NATRIURETIC PEPTIDE: CPT

## 2020-01-18 PROCEDURE — 93458 L HRT ARTERY/VENTRICLE ANGIO: CPT | Mod: 59

## 2020-01-18 PROCEDURE — 99153 MOD SED SAME PHYS/QHP EA: CPT

## 2020-01-18 PROCEDURE — 85027 COMPLETE CBC AUTOMATED: CPT

## 2020-01-18 PROCEDURE — C1769: CPT

## 2020-01-18 PROCEDURE — 83735 ASSAY OF MAGNESIUM: CPT

## 2020-01-18 PROCEDURE — C1894: CPT

## 2020-01-18 PROCEDURE — 80061 LIPID PANEL: CPT

## 2020-01-18 PROCEDURE — C1874: CPT

## 2020-01-18 PROCEDURE — 83605 ASSAY OF LACTIC ACID: CPT

## 2020-01-18 PROCEDURE — 99152 MOD SED SAME PHYS/QHP 5/>YRS: CPT

## 2020-01-18 PROCEDURE — 90686 IIV4 VACC NO PRSV 0.5 ML IM: CPT

## 2020-01-18 PROCEDURE — C9606: CPT | Mod: LC

## 2020-01-18 PROCEDURE — 82553 CREATINE MB FRACTION: CPT

## 2020-01-18 PROCEDURE — C1887: CPT

## 2020-01-18 RX ORDER — ASPIRIN/CALCIUM CARB/MAGNESIUM 324 MG
1 TABLET ORAL
Qty: 30 | Refills: 0
Start: 2020-01-18 | End: 2020-02-16

## 2020-01-18 RX ORDER — METOPROLOL TARTRATE 50 MG
1 TABLET ORAL
Qty: 30 | Refills: 0
Start: 2020-01-18 | End: 2020-02-16

## 2020-01-18 RX ORDER — ESOMEPRAZOLE MAGNESIUM 40 MG/1
1 CAPSULE, DELAYED RELEASE ORAL
Qty: 0 | Refills: 0 | DISCHARGE

## 2020-01-18 RX ORDER — LISINOPRIL 2.5 MG/1
1 TABLET ORAL
Qty: 30 | Refills: 0
Start: 2020-01-18 | End: 2020-02-16

## 2020-01-18 RX ORDER — SODIUM CHLORIDE 0.65 %
1 AEROSOL, SPRAY (ML) NASAL EVERY 6 HOURS
Refills: 0 | Status: DISCONTINUED | OUTPATIENT
Start: 2020-01-18 | End: 2020-01-18

## 2020-01-18 RX ORDER — ATORVASTATIN CALCIUM 80 MG/1
1 TABLET, FILM COATED ORAL
Qty: 30 | Refills: 0
Start: 2020-01-18 | End: 2020-02-16

## 2020-01-18 RX ORDER — TICAGRELOR 90 MG/1
1 TABLET ORAL
Qty: 60 | Refills: 0
Start: 2020-01-18 | End: 2020-02-16

## 2020-01-18 RX ADMIN — TICAGRELOR 90 MILLIGRAM(S): 90 TABLET ORAL at 05:13

## 2020-01-18 RX ADMIN — CAPTOPRIL 6.25 MILLIGRAM(S): 12.5 TABLET ORAL at 12:44

## 2020-01-18 RX ADMIN — INFLUENZA VIRUS VACCINE 0.5 MILLILITER(S): 15; 15; 15; 15 SUSPENSION INTRAMUSCULAR at 12:48

## 2020-01-18 RX ADMIN — HEPARIN SODIUM 5000 UNIT(S): 5000 INJECTION INTRAVENOUS; SUBCUTANEOUS at 05:14

## 2020-01-18 RX ADMIN — HEPARIN SODIUM 5000 UNIT(S): 5000 INJECTION INTRAVENOUS; SUBCUTANEOUS at 12:45

## 2020-01-18 RX ADMIN — Medication 25 MILLIGRAM(S): at 05:13

## 2020-01-18 RX ADMIN — CAPTOPRIL 6.25 MILLIGRAM(S): 12.5 TABLET ORAL at 05:14

## 2020-01-18 RX ADMIN — PANTOPRAZOLE SODIUM 40 MILLIGRAM(S): 20 TABLET, DELAYED RELEASE ORAL at 05:13

## 2020-01-18 RX ADMIN — Medication 81 MILLIGRAM(S): at 12:43

## 2020-01-18 NOTE — DISCHARGE NOTE PROVIDER - NSDCCPCAREPLAN_GEN_ALL_CORE_FT
PRINCIPAL DISCHARGE DIAGNOSIS  Diagnosis: ST elevation myocardial infarction (STEMI)  Assessment and Plan of Treatment: Cardiac catheterization or coronary angiogram is done to understand how the heart is working and to look at the coronary arteries which supply oxygen to the heart muscles, to look at the heart chambers and the valves in the heart.  The doctor uses your groin or your arm to do the procedure  Your doctor will let you know when you can drive and do your usual physical activities  You will need to see your doctor within one week after discharge  Call your doctor if the insertion site bleeds a lot, if you get a fever or have pain, swelling, or redness where the tube went in, or if your leg feels weak, numb, or cold        SECONDARY DISCHARGE DIAGNOSES  Diagnosis: Gastritis  Assessment and Plan of Treatment: Call your healthcare provider:  If you have belly pain that gets worse or doesn't go away  If you vomit blood or have black bowel movements  If you are losing weight (without trying)  Avoid NSAIDs- (aspirin, ibuprofen, advil, motrin, naproxen, aleve, naprosyn)  Avoid alcoholic beverages  Take all medicaions as prescribed.  Call your healthcare provider for a follow-up appointment within one week

## 2020-01-18 NOTE — PROGRESS NOTE ADULT - PROBLEM SELECTOR PLAN 2
-ECHO with EF 45-50% and Mild segmental LV Systolic dysfunction  -Continue Metoprolol succinate 50mg daily  -Continue Lisinopril 10mg daily   -Follow up with cardiology as outpatient
cont PPI. gastric ulcer risk on DAPT d/w pt and signs and symptoms d/w pt

## 2020-01-18 NOTE — PROGRESS NOTE ADULT - PROBLEM SELECTOR PLAN 1
-IWSTEMI s/p LHC with 100% pLCx stenosis s/p GUILLERMO  -Cardiology recs appreciated  -Continue ASA/Brilinta/Statin/Metoprolol/Lisinopril  -Encourage diet and exercise  -Follow up with cardiology as outpatient
cont asa, ticagrelor, statin, metoprolol. monitor on new captopril. risks of missed doses of asa and ticagrelor d/w pt. bleeding risk on DAPT d/w pt as well as signs and symptoms of bleeding and how to manage. No NSAIDs d/w pt

## 2020-01-18 NOTE — PROGRESS NOTE ADULT - ATTENDING COMMENTS
Patient is seen and examined with fellow, NP and the CCU house-staff. I agree with the history, physical and the assessment and plan.  STEMI s/p PCI with GUILLERMO to LCx  - educated on the importance of DAPT   - Ace-inhibitor initiated  - Beta-blocker initiated  - patient is on Lipitor 80 mg daily  - trend cardiac enzymes  - TTE prior to discharge
36mins spent discussing discharge instructions

## 2020-01-18 NOTE — DISCHARGE NOTE PROVIDER - NSDCMRMEDTOKEN_GEN_ALL_CORE_FT
aspirin 81 mg oral tablet, chewable: 1 tab(s) orally once a day  atorvastatin 80 mg oral tablet: 1 tab(s) orally once a day (at bedtime)  Brilinta (ticagrelor) 90 mg oral tablet: 1 tab(s) orally 2 times a day   lisinopril 10 mg oral tablet: 1 tab(s) orally once a day   NexIUM 20 mg oral powder for reconstitution, delayed release: 1 each orally once a day  Toprol-XL 50 mg oral tablet, extended release: 1 tab(s) orally once a day

## 2020-01-18 NOTE — DISCHARGE NOTE NURSING/CASE MANAGEMENT/SOCIAL WORK - PATIENT PORTAL LINK FT
You can access the FollowMyHealth Patient Portal offered by [facility] by registering at the following website: [email or web address]. By joining [facility]’s FollowMyHealth portal, you will also be able to view your health information using other applications (apps) compatible with our system.

## 2020-01-18 NOTE — DISCHARGE NOTE PROVIDER - CARE PROVIDER_API CALL
Christine King (MD)  Internal Medicine  [address] [place] [address], [address] [place] [address]  Phone: [phone]  Fax: [phone]  Established Patient  Follow Up Time: 1 week

## 2020-01-18 NOTE — DISCHARGE NOTE PROVIDER - HOSPITAL COURSE
to be done 55 yo M PMHx gastritis presented with chief complaint of chest pain secondary to Inferior wall STEMI s/p Cardiac Cath with GUILLERMO to pLCX x 1. Tolerated procedure well. Medically stable for discharge home from cardiology standpoint. Continue ASA/Brilinta/Metoprolol/Statin/Lisinopril. Encouraged life style modification and follow up with Cardiology.    Pt also noted with LV dysfunction on ECHO with EF 45-50% and Mild segmental LV Systolic dysfunction    -Continue Metoprolol succinate 50mg daily    -Continue Lisinopril 10mg daily     -Follow up with cardiology as outpatient. 53 yo M PMHx gastritis presented with chief complaint of chest pain secondary to Inferior wall STEMI s/p Cardiac Cath with GUILLERMO to pLCX x 1. Tolerated procedure well. Medically stable for discharge home from cardiology standpoint. Continue ASA/Brilinta/Metoprolol/Statin/Lisinopril. Encouraged life style modification and follow up with Cardiology.    Pt also noted with LV dysfunction on ECHO with EF 45-50% and Mild segmental LV Systolic dysfunction    -Continue Metoprolol succinate 50mg daily    -Continue Lisinopril 10mg daily     -Follow up with cardiology as outpatient.     DCP and medication reconciliation discuss with Dr Hernandez and in agreement. Patient is hemodynamically stable and cleared for discharge.

## 2020-01-18 NOTE — PROGRESS NOTE ADULT - SUBJECTIVE AND OBJECTIVE BOX
Renaldo Hernandez M.D. Pager Number [phone]    Patient is a 54y old  Male who presents with a chief complaint of STEMI (17 Jan 2020 23:57)      SUBJECTIVE / OVERNIGHT EVENTS:  Pt seen and examined at bedside. No acute events overnight.  Pt denies cp, palpitations, sob, abd pain, N/V, fever, chills.    ROS:  All other review of systems negative    Allergies    No Known Allergies    Intolerances        MEDICATIONS  (STANDING):  aspirin  chewable 81 milliGRAM(s) Oral daily  atorvastatin 80 milliGRAM(s) Oral at bedtime  captopril 6.25 milliGRAM(s) Oral every 8 hours  heparin  Injectable 5000 Unit(s) SubCutaneous every 8 hours  influenza   Vaccine 0.5 milliLiter(s) IntraMuscular once  metoprolol tartrate 25 milliGRAM(s) Oral two times a day  pantoprazole    Tablet 40 milliGRAM(s) Oral before breakfast  ticagrelor 90 milliGRAM(s) Oral every 12 hours    MEDICATIONS  (PRN):  acetaminophen   Tablet .. 650 milliGRAM(s) Oral every 6 hours PRN Severe Pain (7 - 10)  sodium chloride 0.65% Nasal 1 Spray(s) Both Nostrils every 6 hours PRN Nasal Congestion      Vital Signs Last 24 Hrs  T(C): 36.6 (18 Jan 2020 04:19), Max: 36.6 (17 Jan 2020 12:00)  T(F): 97.9 (18 Jan 2020 04:19), Max: 97.9 (17 Jan 2020 12:00)  HR: 89 (18 Jan 2020 04:19) (76 - 92)  BP: 112/76 (18 Jan 2020 04:19) (84/62 - 134/91)  BP(mean): 87 (17 Jan 2020 13:00) (67 - 87)  RR: 18 (18 Jan 2020 04:19) (17 - 18)  SpO2: 96% (18 Jan 2020 04:19) (96% - 99%)  CAPILLARY BLOOD GLUCOSE        I&O's Summary    17 Jan 2020 07:01  -  18 Jan 2020 07:00  --------------------------------------------------------  IN: 460 mL / OUT: 305 mL / NET: 155 mL    18 Jan 2020 07:01  -  18 Jan 2020 11:28  --------------------------------------------------------  IN: 180 mL / OUT: 0 mL / NET: 180 mL        PHYSICAL EXAM:  GENERAL: NAD, well-developed  HEAD:  Atraumatic, Normocephalic  EYES: EOMI, PERRLA, conjunctiva and sclera clear  NECK: Supple, No JVD  CHEST/LUNG: Clear to auscultation bilaterally; No wheeze  HEART: Regular rate and rhythm; No murmurs, rubs, or gallops  ABDOMEN: Soft, Nontender, Nondistended; Bowel sounds present  EXTREMITIES:  2+ Peripheral Pulses, No clubbing, cyanosis, or edema  NEUROLOGY: AAOx3, non-focal  PSYCH: calm  SKIN: Right groin incision site c/d/i without underlying hematoma    LABS:                        15.2   10.54 )-----------( 292      ( 18 Jan 2020 06:53 )             45.1     01-18    139  |  102  |  16  ----------------------------<  124<H>  3.8   |  19<L>  |  1.01    Ca    9.3      18 Jan 2020 06:53  Phos  2.9     01-17  Mg     2.0     01-17    TPro  7.6  /  Alb  4.4  /  TBili  0.6  /  DBili  x   /  AST  85<H>  /  ALT  50<H>  /  AlkPhos  87  01-18              RADIOLOGY & ADDITIONAL TESTS:  Results Reviewed:   Imaging Personally Reviewed:  Electrocardiogram Personally Reviewed:    COORDINATION OF CARE:  Care Discussed with Consultants/Other Providers [Y/N]:  Prior or Outpatient Records Reviewed [Y/N]:

## 2020-01-21 ENCOUNTER — NON-APPOINTMENT (OUTPATIENT)
Age: 55
End: 2020-01-21

## 2020-01-21 ENCOUNTER — OUTPATIENT (OUTPATIENT)
Dept: OUTPATIENT SERVICES | Facility: HOSPITAL | Age: 55
LOS: 1 days | End: 2020-01-21
Payer: COMMERCIAL

## 2020-01-21 ENCOUNTER — APPOINTMENT (OUTPATIENT)
Dept: CARDIOLOGY | Facility: HOSPITAL | Age: 55
End: 2020-01-21

## 2020-01-21 VITALS
HEART RATE: 77 BPM | SYSTOLIC BLOOD PRESSURE: 115 MMHG | OXYGEN SATURATION: 98 % | DIASTOLIC BLOOD PRESSURE: 76 MMHG | WEIGHT: 196 LBS | HEIGHT: 67 IN | BODY MASS INDEX: 30.76 KG/M2

## 2020-01-21 DIAGNOSIS — Z82.49 FAMILY HISTORY OF ISCHEMIC HEART DISEASE AND OTHER DISEASES OF THE CIRCULATORY SYSTEM: ICD-10-CM

## 2020-01-21 DIAGNOSIS — I25.10 ATHEROSCLEROTIC HEART DISEASE OF NATIVE CORONARY ARTERY WITHOUT ANGINA PECTORIS: ICD-10-CM

## 2020-01-21 DIAGNOSIS — Z98.890 OTHER SPECIFIED POSTPROCEDURAL STATES: Chronic | ICD-10-CM

## 2020-01-21 PROBLEM — Z00.00 ENCOUNTER FOR PREVENTIVE HEALTH EXAMINATION: Status: ACTIVE | Noted: 2020-01-21

## 2020-01-21 PROBLEM — K29.70 GASTRITIS, UNSPECIFIED, WITHOUT BLEEDING: Chronic | Status: ACTIVE | Noted: 2020-01-15

## 2020-01-21 PROCEDURE — G0463: CPT

## 2020-01-21 PROCEDURE — 93005 ELECTROCARDIOGRAM TRACING: CPT

## 2020-01-21 NOTE — PHYSICAL EXAM
[General Appearance - Well Developed] : well developed [Normal Oral Mucosa] : normal oral mucosa [Normal Oropharynx] : normal oropharynx [Heart Sounds] : normal S1 and S2 [Murmurs] : no murmurs present [Arterial Pulses Normal] : the arterial pulses were normal [Auscultation Breath Sounds / Voice Sounds] : lungs were clear to auscultation bilaterally [Respiration, Rhythm And Depth] : normal respiratory rhythm and effort [Abdomen Soft] : soft [Abnormal Walk] : normal gait [Nail Clubbing] : no clubbing of the fingernails [Cyanosis, Localized] : no localized cyanosis [Skin Turgor] : normal skin turgor [Skin Color & Pigmentation] : normal skin color and pigmentation [Impaired Insight] : insight and judgment were intact [Oriented To Time, Place, And Person] : oriented to person, place, and time

## 2020-01-21 NOTE — REVIEW OF SYSTEMS
[Fever] : no fever [Chills] : no chills [Blurry Vision] : no blurred vision [Earache] : no earache [Dyspnea on exertion] : not dyspnea during exertion [Mouth Sores] : no mouth sores [Shortness Of Breath] : no shortness of breath [Chest Pain] : no chest pain [Palpitations] : no palpitations [Wheezing] : no wheezing [Joint Pain] : no joint pain [Tremor] : no tremor was seen

## 2020-01-21 NOTE — ASSESSMENT
[FreeTextEntry1] : 54y M w/ hx of gastritis CAD presenting to establish care after a hospitalization at [facility] for inferior and lateral STEMI s/p DESx1 %.  \par \par Patient is feeling well but was confused with the medications and was taking everything twice a day. \par \par Denies any ALFARO, chest pain, palpitations, orthopnea, PND.\par \par #CAD s/p STEMI w/ PCI to mLCx - patient doing well w/ mild LV systolic dysfunction. Remaining vessels were patent with mild luminal irregularities. \par - cont DAPT\par - will continue with 100 troprol and 20 lisinopril since we was tolerating these doses at home\par - 80mg Lipitor\par \par f/u on 4 weeks\par \par JULY Houston MD\par Cardiology Fellow\par

## 2020-01-21 NOTE — HISTORY OF PRESENT ILLNESS
[FreeTextEntry1] : 54y M w/ hx of gastritis CAD presenting to establish care after a hospitalization at [facility] for inferior and lateral STEMI s/p DESx1 %.  \par \par Patient is feeling well but was confused with the medications and was taking everything twice a day. \par \par Denies any ALFARO, chest pain, palpitations, orthopnea, PND.\par \par [facility]\par Department of Cardiology\par [address]\[place] [address]\par [phone]\par Cath Lab Report -- Comprehensive Report\par Patient: ARSENIO PEREZ\par Study date: 01/15/2020\par Account number: [id number]\par MR number: [id number]\par : 1965\par Gender: Male\par Race: W\par Case Physician(s):\par Juma Luque M.D.\par Juma Luque M.D.\par Fellow:\edgar Cruz M.D.\par Referring Physician:\par INDICATIONS: Initial STEMI.\par HISTORY: There was no prior cardiac history. The patient has hypertension,\par medication-treated dyslipidemia, and a history of current cigarette use.\par PROCEDURE:\par --  Left heart catheterization with ventriculography.\par --  Left coronary angiography.\par --  Right coronary angiography.\par --  Sonosite - Diagnostic.\par --  Intervention on mid circumflex: drug-eluting stent.\par TECHNIQUE: The risks and alternatives of the procedures and conscious\par sedation were explained to the patient and informed consent was obtained.\par Cardiac catheterization performed urgently. Coronary intervention\par performed urgently.\par Local anesthetic given. Right femoral artery access. Left heart\par catheterization. Ventriculography was performed. Left coronary artery\par angiography. The vessel was injected utilizing a catheter. Right coronary\par artery angiography. The vessel was injected utilizing a catheter. Sonosite\par - Diagnostic. RADIATION EXPOSURE: 5.8 min. A successful drug-eluting stent\par was performed on the 100 % lesion in the mid circumflex. Following\par intervention there was a 1 % residual stenosis. According to the ACC/AHA\par classification system, this lesion was a type C lesion. There was JIA 3\par flow before the procedure and JIA 3 flow after the procedure. Vessel\par setup was performed. A 6FR JL4 LAUNCHER guiding catheter was used to\par intubate the vessel. Vessel setup was performed. A BMW UNIVERSAL 190CM\par wire was used to cross the lesion. A 3.50 X 22 ALIZA drug-eluting stent was\par placed across the lesion and deployed at a maximum inflation pressure of\par 12 ruthie.\par CONTRAST GIVEN: Omnipaque 55 ml. Omnipaque 45 ml.\par MEDICATIONS GIVEN: Midazolam, 1 mg, IV. Fentanyl, 25 mcg, IV. Fentanyl, 25\par mcg, IV. Nitroglycerin, 100 mcg, intracoronary. Heparin, 9000 units, IV.\par Integrilin (2 mg/mL), 8.5 ml, IV. Ticagrelor, 180 mg, PO. Integrilin (2\par mg/mL), 8.5 ml, IV.\par VENTRICLES: EF estimated was 49 %.\par CORONARY VESSELS: The coronary circulation is right dominant.\par LM:   --  LM: Angiography showed minor luminal irregularities with no flow\par limiting lesions.\par LAD:   --  LAD: Angiography showed minor luminal irregularities with no\par flow limiting lesions.\par CX:   --  Mid circumflex: There was a 100 % stenosis.\par RCA:   --  RCA: Angiography showed minor luminal irregularities with no\par flow limiting lesions.\par COMPLICATIONS: There were no complications.\par DIAGNOSTIC RECOMMENDATIONS: ASA and Plavix for 1 year\par INTERVENTIONAL RECOMMENDATIONS: ASA and Plavix for 1 year\par Prepared and signed by\par Juma Luque M.D.\par Signed 2020 12:33:25\par HEMODYNAMIC TABLES\par Pressures:  Baseline\par Pressures:  - HR: 74\par Pressures:  - Rhythm:\par Pressures:  -- Aortic Pressure (S/D/M): 120/81/100\par Pressures:  -- Left Ventricle (s/edp): 135/25/--\par Outputs:  Baseline\par Outputs:  -- CALCULATIONS[age]\par Outputs:  -- CALCULATIONS: Body Surface Area: 2.02\par Outputs:  -- CALCULATIONS: Height in cm: 170.00\par Outputs:  -- CALCULATIONS: Sex: Male\par Outputs:  -- CALCULATIONS: Weight in k.70\par \par Patient name: ARSENIO PEREZ\par YOB: 1965   Age: 54 (M)   MR#: [id number]\par Study Date: 2020\par Location: [place] [id number]: Tyler Harden RDCS\par Study quality: Technically fair\par Referring Physician: Ramona Correa MD\par Blood Pressure: 99/62 mmHg\par Height: 170 cm\par Weight: 100 kg\par BSA: 2.1 m2\par ------------------------------------------------------------------------\par PROCEDURE: Transthoracic echocardiogram with 2-D, M-Mode\par and complete spectral and color flow Doppler. Verbal\par consent was obtained for injection of  Ultrasonic Enhancing\par Agent following a discussion of risks and benefits.\par Following intravenous injection of Ultrasonic Enhancing\par Agent , harmonic imaging was performed.\par INDICATION: ST elevation (STEMI) myocardial infarction of\par unspecified site (I21.3)\par ------------------------------------------------------------------------\par Dimensions:    Normal Values:\par LA:     3.7    2.0 - 4.0 cm\par Ao:     3.2    2.0 - 3.8 cm\par SEPTUM: 1.0    0.6 - 1.2 cm\par PWT:    0.9    0.6 - 1.1 cm\par LVIDd:  4.2    3.0 - 5.6 cm\par LVIDs:  3.0    1.8 - 4.0 cm\par Derived variables:\par LVMI: 61 g/m2\par RWT: 0.42\par Fractional short: 29 %\par EF (Visual Estimate): 45-50 %\par Doppler Peak Velocity (m/sec): AoV=1.2\par ------------------------------------------------------------------------\par Observations:\par Mitral Valve: Normal mitral valve. Minimal mitral\par regurgitation.\par Aortic Valve/Aorta: Normal trileaflet aortic valve. Peak\par transaortic valve gradient equals 6 mm Hg, mean transaortic\par valve gradient equals 2 mm Hg, aortic valve velocity time\par integral equals 23 cm. Peak left ventricular outflow tract\par gradient equals 3 mm Hg, mean gradient is equal to 2 mm Hg,\par LVOT velocity time integral equals 22 cm.\par Aortic Root: 3.2 cm.\par Left Atrium: Normal left atrium.  LA volume index = 23\par cc/m2.\par Left Ventricle: Mild segmental left ventricular systolic\par dysfunction. Endocardial visualization enhanced with\par intravenous injection of Ultrasonic Enhancing Agent\par (Definity). The inferolateral wall, and the inferior wall\par are hypokinetic.  Normal left ventricular internal\par dimensions and wall thicknesses.\par Right Heart: Normal right atrium. Normal right ventricular\par size and function. Normal tricuspid valve. Mild-moderate\par tricuspid regurgitation. Normal pulmonic valve.\par Pericardium/Pleura: Normal pericardium with no pericardial\par effusion.\par Hemodynamic: Estimated right atrial pressure is 8 mm Hg.\par Estimated right ventricular systolic pressure equals 35 mm\par Hg, assuming right atrial pressure equals 8 mm Hg,\par consistent with borderline pulmonary hypertension.\par ------------------------------------------------------------------------\par Conclusions:\par 1. Mild segmental left ventricular systolic dysfunction.\par Endocardial visualization enhanced with intravenous\par injection of Ultrasonic Enhancing Agent (Definity). The\par inferolateral wall, and the inferior wall are hypokinetic.\par 2. Normal right ventricular size and function.\par 3. Normal tricuspid valve. Mild-moderate tricuspid\par regurgitation.\par ------------------------------------------------------------------------\par Confirmed on  2020 - 17:28:52 by Allison Flannery M.D.\par \par

## 2020-01-28 RX ORDER — ESOMEPRAZOLE MAGNESIUM 40 MG/1
40 CAPSULE, DELAYED RELEASE ORAL
Qty: 90 | Refills: 1 | Status: ACTIVE | COMMUNITY
Start: 2020-01-21 | End: 1900-01-01

## 2020-01-29 DIAGNOSIS — I21.19 ST ELEVATION (STEMI) MYOCARDIAL INFARCTION INVOLVING OTHER CORONARY ARTERY OF INFERIOR WALL: ICD-10-CM

## 2020-02-25 ENCOUNTER — APPOINTMENT (OUTPATIENT)
Dept: CARDIOLOGY | Facility: HOSPITAL | Age: 55
End: 2020-02-25

## 2020-02-25 ENCOUNTER — OUTPATIENT (OUTPATIENT)
Dept: OUTPATIENT SERVICES | Facility: HOSPITAL | Age: 55
LOS: 1 days | End: 2020-02-25
Payer: COMMERCIAL

## 2020-02-25 VITALS
DIASTOLIC BLOOD PRESSURE: 70 MMHG | BODY MASS INDEX: 30.61 KG/M2 | HEIGHT: 67 IN | HEART RATE: 70 BPM | SYSTOLIC BLOOD PRESSURE: 119 MMHG | OXYGEN SATURATION: 97 % | WEIGHT: 195 LBS

## 2020-02-25 DIAGNOSIS — I25.10 ATHEROSCLEROTIC HEART DISEASE OF NATIVE CORONARY ARTERY WITHOUT ANGINA PECTORIS: ICD-10-CM

## 2020-02-25 DIAGNOSIS — Z98.890 OTHER SPECIFIED POSTPROCEDURAL STATES: Chronic | ICD-10-CM

## 2020-02-25 DIAGNOSIS — K21.9 GASTRO-ESOPHAGEAL REFLUX DISEASE W/OUT ESOPHAGITIS: ICD-10-CM

## 2020-02-25 PROCEDURE — G0463: CPT

## 2020-02-25 NOTE — PHYSICAL EXAM
[Normal Oropharynx] : normal oropharynx [Normal Oral Mucosa] : normal oral mucosa [General Appearance - Well Developed] : well developed [Respiration, Rhythm And Depth] : normal respiratory rhythm and effort [Auscultation Breath Sounds / Voice Sounds] : lungs were clear to auscultation bilaterally [Heart Sounds] : normal S1 and S2 [Murmurs] : no murmurs present [Arterial Pulses Normal] : the arterial pulses were normal [Abdomen Soft] : soft [Cyanosis, Localized] : no localized cyanosis [Nail Clubbing] : no clubbing of the fingernails [Abnormal Walk] : normal gait [Skin Color & Pigmentation] : normal skin color and pigmentation [Skin Turgor] : normal skin turgor [Impaired Insight] : insight and judgment were intact [Oriented To Time, Place, And Person] : oriented to person, place, and time

## 2020-02-26 NOTE — ASSESSMENT
[FreeTextEntry1] : 54y M w/ hx of gastritis CAD s/p inferior and lateral STEMI s/p DESx1 %.  \par \par Denies any ALFARO, chest pain, palpitations, orthopnea, PND.\par \par #CAD s/p STEMI w/ PCI to mLCx (1/15/2020) - patient doing well w/ mild LV systolic dysfunction. Remaining vessels were patent with mild luminal irregularities. \par - cont DAPT\par - will continue with 100 troprol and 20 lisinopril \par - 80mg Lipitor\par \par # Gastritis - intermittent but chronic. Takes nexium as needed\par - will f/u with GI (referral provided)\par \par RTC in 3 months for repeat blood work\par \par JULY Houston MD\par Cardiology Fellow\par

## 2020-02-26 NOTE — HISTORY OF PRESENT ILLNESS
[FreeTextEntry1] : 54y M w/ hx of gastritis CAD presenting for follow up after a hospitalization at [facility] CCU for inferior and lateral STEMI s/p DESx1 %.  \par \par Denies any ALFARO, chest pain, palpitations, orthopnea, PND.\par \par Taking medications appropriately. \par \par [facility]\par Department of Cardiology\par [address]\[place] [address]\par [phone]\par Cath Lab Report -- Comprehensive Report\par Patient: ARSENIO PEREZ\par Study date: 01/15/2020\par Account number: [id number]\par MR number: [id number]\par : 1965\par Gender: Male\par Race: W\par Case Physician(s):\edgar Luque M.D.\par Juma Luque M.D.\par Fellow:\edgar Cruz M.D.\par Referring Physician:\par INDICATIONS: Initial STEMI.\par HISTORY: There was no prior cardiac history. The patient has hypertension,\par medication-treated dyslipidemia, and a history of current cigarette use.\par PROCEDURE:\par --  Left heart catheterization with ventriculography.\par --  Left coronary angiography.\par --  Right coronary angiography.\par --  Sonosite - Diagnostic.\par --  Intervention on mid circumflex: drug-eluting stent.\par TECHNIQUE: The risks and alternatives of the procedures and conscious\par sedation were explained to the patient and informed consent was obtained.\par Cardiac catheterization performed urgently. Coronary intervention\par performed urgently.\par Local anesthetic given. Right femoral artery access. Left heart\par catheterization. Ventriculography was performed. Left coronary artery\par angiography. The vessel was injected utilizing a catheter. Right coronary\par artery angiography. The vessel was injected utilizing a catheter. Sonosite\par - Diagnostic. RADIATION EXPOSURE: 5.8 min. A successful drug-eluting stent\par was performed on the 100 % lesion in the mid circumflex. Following\par intervention there was a 1 % residual stenosis. According to the ACC/AHA\par classification system, this lesion was a type C lesion. There was JIA 3\par flow before the procedure and JIA 3 flow after the procedure. Vessel\par setup was performed. A 6FR JL4 LAUNCHER guiding catheter was used to\par intubate the vessel. Vessel setup was performed. A BMW UNIVERSAL 190CM\par wire was used to cross the lesion. A 3.50 X 22 ALIZA drug-eluting stent was\par placed across the lesion and deployed at a maximum inflation pressure of\par 12 ruthie.\par CONTRAST GIVEN: Omnipaque 55 ml. Omnipaque 45 ml.\par MEDICATIONS GIVEN: Midazolam, 1 mg, IV. Fentanyl, 25 mcg, IV. Fentanyl, 25\par mcg, IV. Nitroglycerin, 100 mcg, intracoronary. Heparin, 9000 units, IV.\par Integrilin (2 mg/mL), 8.5 ml, IV. Ticagrelor, 180 mg, PO. Integrilin (2\par mg/mL), 8.5 ml, IV.\par VENTRICLES: EF estimated was 49 %.\par CORONARY VESSELS: The coronary circulation is right dominant.\par LM:   --  LM: Angiography showed minor luminal irregularities with no flow\par limiting lesions.\par LAD:   --  LAD: Angiography showed minor luminal irregularities with no\par flow limiting lesions.\par CX:   --  Mid circumflex: There was a 100 % stenosis.\par RCA:   --  RCA: Angiography showed minor luminal irregularities with no\par flow limiting lesions.\par COMPLICATIONS: There were no complications.\par DIAGNOSTIC RECOMMENDATIONS: ASA and Plavix for 1 year\par INTERVENTIONAL RECOMMENDATIONS: ASA and Plavix for 1 year\par Prepared and signed by\par Juma Luque M.D.\par Signed 2020 12:33:25\par HEMODYNAMIC TABLES\par Pressures:  Baseline\par Pressures:  - HR: 74\par Pressures:  - Rhythm:\par Pressures:  -- Aortic Pressure (S/D/M): 120/81/100\par Pressures:  -- Left Ventricle (s/edp): 135/25/--\par Outputs:  Baseline\par Outputs:  -- CALCULATIONS[age]\par Outputs:  -- CALCULATIONS: Body Surface Area: 2.02\par Outputs:  -- CALCULATIONS: Height in cm: 170.00\par Outputs:  -- CALCULATIONS: Sex: Male\par Outputs:  -- CALCULATIONS: Weight in k.70\par \par Patient name: ARSENIO PEREZ\par YOB: 1965   Age: 54 (M)   MR#: [id number]\par Study Date: 2020\par Location: [place] [id number]: Tyler Harden RDCS\par Study quality: Technically fair\par Referring Physician: Ramona Correa MD\par Blood Pressure: 99/62 mmHg\par Height: 170 cm\par Weight: 100 kg\par BSA: 2.1 m2\par ------------------------------------------------------------------------\par PROCEDURE: Transthoracic echocardiogram with 2-D, M-Mode\par and complete spectral and color flow Doppler. Verbal\par consent was obtained for injection of  Ultrasonic Enhancing\par Agent following a discussion of risks and benefits.\par Following intravenous injection of Ultrasonic Enhancing\par Agent , harmonic imaging was performed.\par INDICATION: ST elevation (STEMI) myocardial infarction of\par unspecified site (I21.3)\par ------------------------------------------------------------------------\par Dimensions:    Normal Values:\par LA:     3.7    2.0 - 4.0 cm\par Ao:     3.2    2.0 - 3.8 cm\par SEPTUM: 1.0    0.6 - 1.2 cm\par PWT:    0.9    0.6 - 1.1 cm\par LVIDd:  4.2    3.0 - 5.6 cm\par LVIDs:  3.0    1.8 - 4.0 cm\par Derived variables:\par LVMI: 61 g/m2\par RWT: 0.42\par Fractional short: 29 %\par EF (Visual Estimate): 45-50 %\par Doppler Peak Velocity (m/sec): AoV=1.2\par ------------------------------------------------------------------------\par Observations:\par Mitral Valve: Normal mitral valve. Minimal mitral\par regurgitation.\par Aortic Valve/Aorta: Normal trileaflet aortic valve. Peak\par transaortic valve gradient equals 6 mm Hg, mean transaortic\par valve gradient equals 2 mm Hg, aortic valve velocity time\par integral equals 23 cm. Peak left ventricular outflow tract\par gradient equals 3 mm Hg, mean gradient is equal to 2 mm Hg,\par LVOT velocity time integral equals 22 cm.\par Aortic Root: 3.2 cm.\par Left Atrium: Normal left atrium.  LA volume index = 23\par cc/m2.\par Left Ventricle: Mild segmental left ventricular systolic\par dysfunction. Endocardial visualization enhanced with\par intravenous injection of Ultrasonic Enhancing Agent\par (Definity). The inferolateral wall, and the inferior wall\par are hypokinetic.  Normal left ventricular internal\par dimensions and wall thicknesses.\par Right Heart: Normal right atrium. Normal right ventricular\par size and function. Normal tricuspid valve. Mild-moderate\par tricuspid regurgitation. Normal pulmonic valve.\par Pericardium/Pleura: Normal pericardium with no pericardial\par effusion.\par Hemodynamic: Estimated right atrial pressure is 8 mm Hg.\par Estimated right ventricular systolic pressure equals 35 mm\par Hg, assuming right atrial pressure equals 8 mm Hg,\par consistent with borderline pulmonary hypertension.\par ------------------------------------------------------------------------\par Conclusions:\par 1. Mild segmental left ventricular systolic dysfunction.\par Endocardial visualization enhanced with intravenous\par injection of Ultrasonic Enhancing Agent (Definity). The\par inferolateral wall, and the inferior wall are hypokinetic.\par 2. Normal right ventricular size and function.\par 3. Normal tricuspid valve. Mild-moderate tricuspid\par regurgitation.\par ------------------------------------------------------------------------\par Confirmed on  2020 - 17:28:52 by Allison Flannery M.D.\par \par

## 2020-02-26 NOTE — REVIEW OF SYSTEMS
[Fever] : no fever [Chills] : no chills [Blurry Vision] : no blurred vision [Earache] : no earache [Mouth Sores] : no mouth sores [Shortness Of Breath] : no shortness of breath [Dyspnea on exertion] : not dyspnea during exertion [Chest Pain] : no chest pain [Palpitations] : no palpitations [Wheezing] : no wheezing [Joint Pain] : no joint pain [Tremor] : no tremor was seen

## 2020-06-02 ENCOUNTER — APPOINTMENT (OUTPATIENT)
Dept: CARDIOLOGY | Facility: HOSPITAL | Age: 55
End: 2020-06-02

## 2021-02-07 ENCOUNTER — RX RENEWAL (OUTPATIENT)
Age: 56
End: 2021-02-07

## 2021-02-10 ENCOUNTER — APPOINTMENT (OUTPATIENT)
Dept: CARDIOLOGY | Facility: HOSPITAL | Age: 56
End: 2021-02-10

## 2021-02-10 ENCOUNTER — OUTPATIENT (OUTPATIENT)
Dept: OUTPATIENT SERVICES | Facility: HOSPITAL | Age: 56
LOS: 1 days | End: 2021-02-10
Payer: MEDICAID

## 2021-02-10 VITALS
BODY MASS INDEX: 28.25 KG/M2 | DIASTOLIC BLOOD PRESSURE: 77 MMHG | WEIGHT: 180 LBS | HEART RATE: 51 BPM | OXYGEN SATURATION: 98 % | SYSTOLIC BLOOD PRESSURE: 117 MMHG | HEIGHT: 67 IN

## 2021-02-10 DIAGNOSIS — Z98.890 OTHER SPECIFIED POSTPROCEDURAL STATES: Chronic | ICD-10-CM

## 2021-02-10 DIAGNOSIS — I25.10 ATHEROSCLEROTIC HEART DISEASE OF NATIVE CORONARY ARTERY WITHOUT ANGINA PECTORIS: ICD-10-CM

## 2021-02-10 PROCEDURE — G0463: CPT

## 2021-02-10 PROCEDURE — 93005 ELECTROCARDIOGRAM TRACING: CPT

## 2021-02-10 NOTE — PHYSICAL EXAM
[General Appearance - Well Developed] : well developed [Normal Oral Mucosa] : normal oral mucosa [Normal Oropharynx] : normal oropharynx [Respiration, Rhythm And Depth] : normal respiratory rhythm and effort [Auscultation Breath Sounds / Voice Sounds] : lungs were clear to auscultation bilaterally [Heart Sounds] : normal S1 and S2 [Murmurs] : no murmurs present [Arterial Pulses Normal] : the arterial pulses were normal [Abdomen Soft] : soft [Abnormal Walk] : normal gait [Nail Clubbing] : no clubbing of the fingernails [Cyanosis, Localized] : no localized cyanosis [Skin Color & Pigmentation] : normal skin color and pigmentation [Skin Turgor] : normal skin turgor [Oriented To Time, Place, And Person] : oriented to person, place, and time [Impaired Insight] : insight and judgment were intact [Normal Jugular Venous V Waves Present] : normal jugular venous V waves present

## 2021-02-10 NOTE — HISTORY OF PRESENT ILLNESS
[FreeTextEntry1] : 55y M w/ hx of inferolateral STEMI s/p DESx1 %, gastritis presenting for f/u. Patient w/ no complaints. Denies CP, ALFARO, palpitations, orthopnea, PND.\par \par Taking medications appropriately. \par \par ECG 2/2021\par Sinus  Bradycardia \par -Prominent R(V1) and left axis -nonspecific  -Seen with pulmonary disease -possible anterior fascicular block. \par  -  Diffuse nonspecific T-abnormality. \par \par Cardiac cath 1/2021\par VENTRICLES: EF estimated was 49 %.\par CORONARY VESSELS: The coronary circulation is right dominant.\par LM:   --  LM: Angiography showed minor luminal irregularities with no flow limiting lesions.\par LAD:   --  LAD: Angiography showed minor luminal irregularities with no flow limiting lesions.\par CX:   --  Mid circumflex: There was a 100 % stenosis.\par RCA:   --  RCA: Angiography showed minor luminal irregularities with no flow limiting lesions.\par COMPLICATIONS: There were no complications.\par DIAGNOSTIC RECOMMENDATIONS: ASA and Plavix for 1 year\par INTERVENTIONAL RECOMMENDATIONS: ASA and Plavix for 1 year\par Prepared and signed by\par Juma Luque M.D.\par Signed 01/16/2020 12:33:25\par \par Echo 1/2020\par Conclusions:\par EF 45% visual estimate\par 1. Mild segmental left ventricular systolic dysfunction.\par Endocardial visualization enhanced with intravenous injection of Ultrasonic Enhancing Agent (Definity). The\par inferolateral wall, and the inferior wall are hypokinetic.\par 2. Normal right ventricular size and function.\par 3. Normal tricuspid valve. Mild-moderate tricuspid regurgitation.\par ------------------------------------------------------------------------\par Confirmed on  1/16/2020 - 17:28:52 by Allison Flannery M.D.\par \par

## 2021-02-10 NOTE — REVIEW OF SYSTEMS
[Fever] : no fever [Chills] : no chills [Blurry Vision] : no blurred vision [Earache] : no earache [Shortness Of Breath] : no shortness of breath [Mouth Sores] : no mouth sores [Dyspnea on exertion] : not dyspnea during exertion [Chest Pain] : no chest pain [Palpitations] : no palpitations [Wheezing] : no wheezing [Tremor] : no tremor was seen [Joint Pain] : no joint pain

## 2021-06-20 NOTE — PATIENT PROFILE ADULT - NSPROMEDSPUMP_GEN_A_NUR
Hospital discharge follow up call to pt using Sim Negrete ID# [id number], no answer.  RN will attempt call back at another time.    
Second attempt to reach patient for hospital discharge follow up using Stephany jhaerBeth ID# [id number]. Call wouldn't connect. No alternative number, no LEIGH ANN to talk with family.  RN has made two unsuccessful attempts to reach patient.   Encounter closed.        
none

## 2021-08-16 NOTE — ED PROVIDER NOTE - NSBENEFITOFTRANSFER_ED_A_ED
Addended by: REG GARCIA on: 8/16/2021 02:33 PM     Modules accepted: Orders    
Worsening of Condition, Death, or Disability if Patient Does Not Transfer/Obtain Level of Care/Service Not Available at this Facility

## 2021-08-18 ENCOUNTER — APPOINTMENT (OUTPATIENT)
Dept: CARDIOLOGY | Facility: HOSPITAL | Age: 56
End: 2021-08-18

## 2021-08-18 ENCOUNTER — OUTPATIENT (OUTPATIENT)
Dept: OUTPATIENT SERVICES | Facility: HOSPITAL | Age: 56
LOS: 1 days | End: 2021-08-18
Payer: MEDICAID

## 2021-08-18 VITALS
BODY MASS INDEX: 32.33 KG/M2 | HEART RATE: 58 BPM | DIASTOLIC BLOOD PRESSURE: 71 MMHG | OXYGEN SATURATION: 98 % | HEIGHT: 67 IN | SYSTOLIC BLOOD PRESSURE: 115 MMHG | WEIGHT: 206 LBS

## 2021-08-18 DIAGNOSIS — I25.10 ATHEROSCLEROTIC HEART DISEASE OF NATIVE CORONARY ARTERY WITHOUT ANGINA PECTORIS: ICD-10-CM

## 2021-08-18 DIAGNOSIS — Z98.890 OTHER SPECIFIED POSTPROCEDURAL STATES: Chronic | ICD-10-CM

## 2021-08-18 PROCEDURE — G0463: CPT

## 2021-08-18 PROCEDURE — 93005 ELECTROCARDIOGRAM TRACING: CPT

## 2021-08-25 NOTE — HISTORY OF PRESENT ILLNESS
[FreeTextEntry1] : 56y M w/ hx of inferolateral STEMI s/p DESx1 % (1/2021), gastritis presenting for f/u. Patient reports occasional gastritis (once every two weeks, relieved w/ prn nexium); no other complaints. Denies CP, ALFARO, palpitations, orthopnea, PND.\par \par Taking medications appropriately. \par \par ECG 8/2021\par Sinus  Bradycardia \par - incomplete RBBB, LAFB\par \par Cardiac cath 1/2021\par VENTRICLES: EF estimated was 49 %.\par CORONARY VESSELS: The coronary circulation is right dominant.\par LM:   --  LM: Angiography showed minor luminal irregularities with no flow limiting lesions.\par LAD:   --  LAD: Angiography showed minor luminal irregularities with no flow limiting lesions.\par CX:   --  Mid circumflex: There was a 100 % stenosis.\par RCA:   --  RCA: Angiography showed minor luminal irregularities with no flow limiting lesions.\par COMPLICATIONS: There were no complications.\par DIAGNOSTIC RECOMMENDATIONS: ASA and Plavix for 1 year\par INTERVENTIONAL RECOMMENDATIONS: ASA and Plavix for 1 year\par Prepared and signed by\par Juma Luque M.D.\par Signed 01/16/2020 12:33:25\par \par Echo 1/2020\par Conclusions:\par EF 45% visual estimate\par 1. Mild segmental left ventricular systolic dysfunction.\par Endocardial visualization enhanced with intravenous injection of Ultrasonic Enhancing Agent (Definity). The\par inferolateral wall, and the inferior wall are hypokinetic.\par 2. Normal right ventricular size and function.\par 3. Normal tricuspid valve. Mild-moderate tricuspid regurgitation.\par ------------------------------------------------------------------------\par Confirmed on  1/16/2020 - 17:28:52 by Allison Flannery M.D.\par \par

## 2021-08-25 NOTE — PHYSICAL EXAM
[General Appearance - Well Developed] : well developed [Normal Oral Mucosa] : normal oral mucosa [Normal Oropharynx] : normal oropharynx [Normal Jugular Venous V Waves Present] : normal jugular venous V waves present [Respiration, Rhythm And Depth] : normal respiratory rhythm and effort [Auscultation Breath Sounds / Voice Sounds] : lungs were clear to auscultation bilaterally [Heart Sounds] : normal S1 and S2 [Murmurs] : no murmurs present [Arterial Pulses Normal] : the arterial pulses were normal [Abdomen Soft] : soft [Abnormal Walk] : normal gait [Nail Clubbing] : no clubbing of the fingernails [Cyanosis, Localized] : no localized cyanosis [Skin Color & Pigmentation] : normal skin color and pigmentation [Skin Turgor] : normal skin turgor [Oriented To Time, Place, And Person] : oriented to person, place, and time [Impaired Insight] : insight and judgment were intact [Edema] : no peripheral edema present [FreeTextEntry1] : sinus bradycardia

## 2021-08-25 NOTE — END OF VISIT
[] : Fellow [FreeTextEntry3] : Agree with cardiology fellow's assessment. 56-year old man s/p PCI to the LCX earlier this year. Current medical therapy is appropriate; agree with recommendations.

## 2022-02-23 ENCOUNTER — APPOINTMENT (OUTPATIENT)
Dept: CARDIOLOGY | Facility: HOSPITAL | Age: 57
End: 2022-02-23

## 2022-02-23 ENCOUNTER — OUTPATIENT (OUTPATIENT)
Dept: OUTPATIENT SERVICES | Facility: HOSPITAL | Age: 57
LOS: 1 days | End: 2022-02-23
Payer: MEDICAID

## 2022-02-23 VITALS
SYSTOLIC BLOOD PRESSURE: 114 MMHG | BODY MASS INDEX: 29.82 KG/M2 | DIASTOLIC BLOOD PRESSURE: 75 MMHG | OXYGEN SATURATION: 97 % | HEIGHT: 67 IN | WEIGHT: 190 LBS | HEART RATE: 66 BPM

## 2022-02-23 DIAGNOSIS — I25.10 ATHEROSCLEROTIC HEART DISEASE OF NATIVE CORONARY ARTERY WITHOUT ANGINA PECTORIS: ICD-10-CM

## 2022-02-23 DIAGNOSIS — Z98.890 OTHER SPECIFIED POSTPROCEDURAL STATES: Chronic | ICD-10-CM

## 2022-02-23 PROCEDURE — G0463: CPT

## 2022-02-23 PROCEDURE — 93005 ELECTROCARDIOGRAM TRACING: CPT

## 2022-02-23 NOTE — REVIEW OF SYSTEMS
[Negative] : Heme/Lymph [SOB] : no shortness of breath [Dyspnea on exertion] : not dyspnea during exertion [Chest Discomfort] : no chest discomfort [Lower Ext Edema] : no extremity edema [Leg Claudication] : no intermittent leg claudication [Palpitations] : no palpitations [Orthopnea] : no orthopnea [PND] : no PND [Syncope] : no syncope [Cough] : no cough [Wheezing] : no wheezing

## 2022-02-23 NOTE — PHYSICAL EXAM
[Well Developed] : well developed [Well Nourished] : well nourished [No Acute Distress] : no acute distress [Normal Conjunctiva] : normal conjunctiva [Normal Venous Pressure] : normal venous pressure [Normal S1, S2] : normal S1, S2 [No Murmur] : no murmur [No Rub] : no rub [No Gallop] : no gallop [Clear Lung Fields] : clear lung fields [Good Air Entry] : good air entry [No Respiratory Distress] : no respiratory distress  [Soft] : abdomen soft [Non Tender] : non-tender [No Masses/organomegaly] : no masses/organomegaly [Normal Gait] : normal gait [No Edema] : no edema [No Cyanosis] : no cyanosis [No Clubbing] : no clubbing [No Rash] : no rash [Moves all extremities] : moves all extremities [No Focal Deficits] : no focal deficits [Normal Speech] : normal speech [Normal memory] : normal memory [Alert and Oriented] : alert and oriented

## 2022-02-23 NOTE — REASON FOR VISIT
[FreeTextEntry1] : 56y M w/ hx of inferolateral STEMI s/p DESx1 % (1/2021), gastritis presenting for f/u. Patient w/ no complaints. Denies CP, ALFARO, palpitations, orthopnea, PND, LE edema.\par \par Taking medications appropriately. \par \par ECG 2/202\par NSR, low voltage, nonspecific ST-T changes\par \par Cardiac cath 1/2021\par VENTRICLES: EF estimated was 49 %.\par CORONARY VESSELS: The coronary circulation is right dominant.\par LM: -- LM: Angiography showed minor luminal irregularities with no flow limiting lesions.\par LAD: -- LAD: Angiography showed minor luminal irregularities with no flow limiting lesions.\par CX: -- Mid circumflex: There was a 100 % stenosis.\par RCA: -- RCA: Angiography showed minor luminal irregularities with no flow limiting lesions.\par COMPLICATIONS: There were no complications.\par DIAGNOSTIC RECOMMENDATIONS: ASA and Plavix for 1 year\par INTERVENTIONAL RECOMMENDATIONS: ASA and Plavix for 1 year\par Prepared and signed by\par Juma Luque M.D.\par Signed 01/16/2020 12:33:25\par \par Echo 1/2020\par Conclusions:\par EF 45% visual estimate\par 1. Mild segmental left ventricular systolic dysfunction.\par Endocardial visualization enhanced with intravenous injection of Ultrasonic Enhancing Agent (Definity). The\par inferolateral wall, and the inferior wall are hypokinetic.\par 2. Normal right ventricular size and function.\par 3. Normal tricuspid valve. Mild-moderate tricuspid regurgitation.\par ------------------------------------------------------------------------\par Confirmed on 1/16/2020 - 17:28:52 by Allison Flannery M.D.\par

## 2022-03-23 ENCOUNTER — NON-APPOINTMENT (OUTPATIENT)
Age: 57
End: 2022-03-23

## 2022-03-28 ENCOUNTER — RX RENEWAL (OUTPATIENT)
Age: 57
End: 2022-03-28

## 2022-08-02 NOTE — ED ADULT NURSE NOTE - NSFALLRSKUNASSIST_ED_ALL_ED
CHIEF COMPLAINT(S):   Chief Complaint   Patient presents with   • Left Wrist - Pain, Numbness   • Right Wrist - Pain, Numbness   • Office Visit     Hand / UE Inj/Asp: R carpal tunnel for carpal tunnel syndrome on 8/3/2022 12:22 PM  Indications: pain  Details: 22 G needle, volar approach  Medications: 40 mg triamcinolone acetonide 40 MG/ML; 4 mL lidocaine 1 %  Outcome: tolerated well, no immediate complications  Procedure, treatment alternatives, risks and benefits explained, specific risks discussed. Consent was given by the patient. Immediately prior to procedure a time out was called to verify the correct patient, procedure, equipment, support staff and site/side marked as required. Patient was prepped and draped in the usual sterile fashion.         HISTORY OF PRESENT ILLNESS:  Fatimah Jerez is a 38 year old right-handed female that returns to clinic today for a follow-up of her bilateral hand carpal tunnel syndrome. At her last office visit on 7/13/22, which was a Telehealth visit, her EMG was reviewed and the patient was offered a cortisone injection.  Today the patient is coming in for that injection.  According to the patient reports no changes since that Telehealth visit.  She continues to have paresthesias along with her bilateral upper extremities.  She reports paresthesias only with certain movements.  Patient reports occasional numbness and tingling sensation.  Patient does report some cramps as well as stiffness in the morning time.  Patient reports also paresthesias when she runs.  Patient is a avid runner.          PAST MEDICAL HISTORY:  Past Medical History:   Diagnosis Date   • Post-surgical hypothyroidism     history large thyroid nodule         PAST SURGICAL HISTORY:  No past surgical history on file.      FAMILY HISTORY:  Family History   Problem Relation Age of Onset   • Rheumatoid Arthritis Mother    • Patient is unaware of any medical problems Father    • Postmenopausal breast cancer Maternal  Grandmother         passed in her late 70's      Reviewed and non-contributory to patient's illness unless otherwise stated above      SOCIAL HISTORY:  Social History     Socioeconomic History   • Marital status:      Spouse name: Not on file   • Number of children: Not on file   • Years of education: Not on file   • Highest education level: Not on file   Occupational History   • Not on file   Tobacco Use   • Smoking status: Never Smoker   • Smokeless tobacco: Never Used   Substance and Sexual Activity   • Alcohol use: Yes     Comment: social   • Drug use: Never   • Sexual activity: Yes   Other Topics Concern   • Not on file   Social History Narrative   • Not on file     Social Determinants of Health     Financial Resource Strain: Not on file   Food Insecurity: Not on file   Transportation Needs: Not on file   Physical Activity: Not on file   Stress: Not on file   Social Connections: Not on file   Intimate Partner Violence: Not on file        MEDICATIONS:  Current Outpatient Medications   Medication Sig Dispense Refill   • levonorgestrel-ethinyl estradiol 0.1-20 MG-MCG per tablet Take 1 tablet by mouth daily. 84 tablet 3   • levothyroxine 50 MCG tablet Take 1 tablet by mouth daily. 90 tablet 3     No current facility-administered medications for this visit.     ALLERGIES:     ALLERGIES:   Allergen Reactions   • Valacyclovir RASH     Abdomen and top of thigh   • Penicillins Other (See Comments)     HIVES  HIVES         VITAL SIGNS:  There were no vitals taken for this visit.  There is no height or weight on file to calculate BMI.    REVIEW OF SYSTEMS:  Skin:No problems with hair or nails. No rash. No new skin lesions.  Heent: Pt denies problems with head, eyes, ears, nose, mouth, throat and neck  Respiratory: No coughing, wheezing, changes in voice, nor shortness of breath  Cardiovascular:No chest pain, palpitations or other cardiac complaints noted  Gastrointestinal: No diarrhea, constipation, abdominal pain  or other complaints noted  Genitourinary: Pt denies urinary pain, bleeding and voiding problems  Musculoskeletal:  Pain located in both hands and wrists and aggravated by use  Neurologic:Numbness: numbness and tingling of both hands.  Psychiatric: Pt denies sleep, anxiety, depression, sexual and other psychiatric problems  Hematologic/Lymphatic/Immunologic: Pt denies hematological, lymphatic and immunological problems  Endocrine: Pt denies thyroid and diabetic problems    EXAM:  This is a 38 year old female, awake, alert, and cooperative. She is well nourished, well developed, and in no apparent distress.  Pulmonary - No increased work of breathing.  Lymphatics - There is no evidence of generalized adenopathy.  Psych-normal mood and affect  Heart: Extremity well perfused  Extremity: Bilateral upper extremity.  Positive median nerve compression test.  PIN AIN and ulnar motor functions are intact.      EMG results:  Impression: (Abnormal)  1. Bilateral Carpal Tunnel Syndrome, worse in the right side.  2. 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  (EMG has been within normal limits)       Further clinical correlation is recommended      ASSESSMENT & PLAN:  Fatimah Jerez is a 38 year old femalethe comes in for bilateral carpal tunnel syndrome.  Diagnosis, pathophysiology and all treatment option were discussed with the patient.  We did review the EMG again.  Clinically the patient does have evidence of carpal tunnel syndrome which is confirmed by EMG and nerve conduction study.  Per EMG there may be a concern for polyneuropathy however this patient does not have any risk factors at this point.  Recommendations were to treat the patient as carpal tunnel patient and then if there is no improvement further evaluation by neurology for polyneuropathy work-up.  Today the cortisone injection I believe will be diagnostic and therapeutic.  Under aseptic condition right carpal tunnel injection was  performed today.  She tolerated this well without any complications.  I will see the patient back in 6 weeks for repeat clinical examination.  At the next visit we can consider contralateral side injection if she wants to do it.  Patient also return sooner as well if she has any issues I will like to proceed with injection of the contralateral side at that time.    Sonal      Diagnosis for this visit:   1. Right carpal tunnel syndrome    2. Left carpal tunnel syndrome    3. Numbness and tingling in both hands          Electronically Signed by:    Minor Lee MD, 08/03/22        This note was dictated by speech recognition software. Minor errors in transcription may be present.   no

## 2022-08-24 ENCOUNTER — NON-APPOINTMENT (OUTPATIENT)
Age: 57
End: 2022-08-24

## 2022-08-24 ENCOUNTER — OUTPATIENT (OUTPATIENT)
Dept: OUTPATIENT SERVICES | Facility: HOSPITAL | Age: 57
LOS: 1 days | End: 2022-08-24
Payer: MEDICAID

## 2022-08-24 ENCOUNTER — APPOINTMENT (OUTPATIENT)
Dept: CARDIOLOGY | Facility: HOSPITAL | Age: 57
End: 2022-08-24

## 2022-08-24 VITALS
HEIGHT: 67 IN | DIASTOLIC BLOOD PRESSURE: 80 MMHG | OXYGEN SATURATION: 98 % | HEART RATE: 66 BPM | SYSTOLIC BLOOD PRESSURE: 125 MMHG

## 2022-08-24 DIAGNOSIS — Z87.891 PERSONAL HISTORY OF NICOTINE DEPENDENCE: ICD-10-CM

## 2022-08-24 DIAGNOSIS — Z98.890 OTHER SPECIFIED POSTPROCEDURAL STATES: Chronic | ICD-10-CM

## 2022-08-24 DIAGNOSIS — I25.10 ATHEROSCLEROTIC HEART DISEASE OF NATIVE CORONARY ARTERY WITHOUT ANGINA PECTORIS: ICD-10-CM

## 2022-08-24 PROCEDURE — G0463: CPT

## 2022-08-24 PROCEDURE — 93005 ELECTROCARDIOGRAM TRACING: CPT

## 2022-08-24 RX ORDER — TICAGRELOR 90 MG/1
90 TABLET ORAL
Qty: 180 | Refills: 3 | Status: DISCONTINUED | COMMUNITY
Start: 2020-01-21 | End: 2022-08-24

## 2022-08-25 DIAGNOSIS — Z87.891 PERSONAL HISTORY OF NICOTINE DEPENDENCE: ICD-10-CM

## 2022-08-25 NOTE — PHYSICAL EXAM
[Well Developed] : well developed [Well Nourished] : well nourished [No Acute Distress] : no acute distress [Normal Conjunctiva] : normal conjunctiva [Normal Venous Pressure] : normal venous pressure [Normal S1, S2] : normal S1, S2 [No Murmur] : no murmur [No Rub] : no rub [No Gallop] : no gallop [Clear Lung Fields] : clear lung fields [Good Air Entry] : good air entry [No Respiratory Distress] : no respiratory distress  [Soft] : abdomen soft [Non Tender] : non-tender [No Masses/organomegaly] : no masses/organomegaly [Normal Bowel Sounds] : normal bowel sounds [Normal Gait] : normal gait [No Edema] : no edema [No Cyanosis] : no cyanosis [No Clubbing] : no clubbing [No Rash] : no rash [No Skin Lesions] : no skin lesions [Moves all extremities] : moves all extremities [No Focal Deficits] : no focal deficits [Alert and Oriented] : alert and oriented [Normal memory] : normal memory

## 2022-08-25 NOTE — REVIEW OF SYSTEMS
[Negative] : Heme/Lymph [SOB] : no shortness of breath [Dyspnea on exertion] : not dyspnea during exertion [Chest Discomfort] : no chest discomfort [Lower Ext Edema] : no extremity edema [Leg Claudication] : no intermittent leg claudication [Palpitations] : no palpitations [Orthopnea] : no orthopnea [PND] : no PND [Syncope] : no syncope [Cough] : no cough [Wheezing] : no wheezing [Coughing Up Blood] : no hemoptysis

## 2022-08-25 NOTE — REASON FOR VISIT
[FreeTextEntry1] : 57y M w/ hx of inferolateral STEMI s/p DESx1 % (1/2020), gastritis presenting for f/u. Patient w/ no complaints. Denies CP, ALFARO, palpitations, orthopnea, PND, LE edema.\par \par Taking medications appropriately. \par \par ECG 2/2022\par NSR, low voltage, nonspecific ST-T changes\par \par Cardiac cath 1/2020\par VENTRICLES: EF estimated was 49 %.\par CORONARY VESSELS: The coronary circulation is right dominant.\par LM: -- LM: Angiography showed minor luminal irregularities with no flow limiting lesions.\par LAD: -- LAD: Angiography showed minor luminal irregularities with no flow limiting lesions.\par CX: -- Mid circumflex: There was a 100 % stenosis.\par RCA: -- RCA: Angiography showed minor luminal irregularities with no flow limiting lesions.\par COMPLICATIONS: There were no complications.\par DIAGNOSTIC RECOMMENDATIONS: ASA and Plavix for 1 year\par INTERVENTIONAL RECOMMENDATIONS: ASA and Plavix for 1 year\par Prepared and signed by\par Juma Luque M.D.\par Signed 01/16/2020 12:33:25\par \par Echo 1/2020\par Conclusions:\par EF 45% visual estimate\par 1. Mild segmental left ventricular systolic dysfunction.\par Endocardial visualization enhanced with intravenous injection of Ultrasonic Enhancing Agent (Definity). The\par inferolateral wall, and the inferior wall are hypokinetic.\par 2. Normal right ventricular size and function.\par 3. Normal tricuspid valve. Mild-moderate tricuspid regurgitation.\par ------------------------------------------------------------------------\par Confirmed on 1/16/2020 - 17:28:52 by Allison Flannery M.D.

## 2022-09-12 LAB
ALBUMIN SERPL ELPH-MCNC: 4.8 G/DL
ALP BLD-CCNC: 90 U/L
ALT SERPL-CCNC: 21 U/L
ANION GAP SERPL CALC-SCNC: 18 MMOL/L
AST SERPL-CCNC: 22 U/L
BASOPHILS # BLD AUTO: 0.03 K/UL
BASOPHILS NFR BLD AUTO: 0.3 %
BILIRUB SERPL-MCNC: 0.2 MG/DL
BUN SERPL-MCNC: 18 MG/DL
CALCIUM SERPL-MCNC: 9.4 MG/DL
CHLORIDE SERPL-SCNC: 105 MMOL/L
CHOLEST SERPL-MCNC: 141 MG/DL
CO2 SERPL-SCNC: 19 MMOL/L
CREAT SERPL-MCNC: 1.09 MG/DL
EGFR: 80 ML/MIN/1.73M2
EOSINOPHIL # BLD AUTO: 0.11 K/UL
EOSINOPHIL NFR BLD AUTO: 1.1 %
ESTIMATED AVERAGE GLUCOSE: 105 MG/DL
GLUCOSE SERPL-MCNC: 98 MG/DL
HBA1C MFR BLD HPLC: 5.3 %
HCT VFR BLD CALC: 43 %
HDLC SERPL-MCNC: 34 MG/DL
HGB BLD-MCNC: 14.8 G/DL
IMM GRANULOCYTES NFR BLD AUTO: 0.1 %
LDLC SERPL CALC-MCNC: 61 MG/DL
LYMPHOCYTES # BLD AUTO: 4.73 K/UL
LYMPHOCYTES NFR BLD AUTO: 48 %
MAN DIFF?: NORMAL
MCHC RBC-ENTMCNC: 30 PG
MCHC RBC-ENTMCNC: 34.4 GM/DL
MCV RBC AUTO: 87 FL
MONOCYTES # BLD AUTO: 0.57 K/UL
MONOCYTES NFR BLD AUTO: 5.8 %
NEUTROPHILS # BLD AUTO: 4.41 K/UL
NEUTROPHILS NFR BLD AUTO: 44.7 %
NONHDLC SERPL-MCNC: 107 MG/DL
PLATELET # BLD AUTO: 248 K/UL
POTASSIUM SERPL-SCNC: 4.4 MMOL/L
PROT SERPL-MCNC: 7.4 G/DL
RBC # BLD: 4.94 M/UL
RBC # FLD: 13.1 %
SODIUM SERPL-SCNC: 142 MMOL/L
TRIGL SERPL-MCNC: 231 MG/DL
TSH SERPL-ACNC: 3.06 UIU/ML
WBC # FLD AUTO: 9.86 K/UL

## 2022-09-26 ENCOUNTER — RX RENEWAL (OUTPATIENT)
Age: 57
End: 2022-09-26

## 2023-03-01 ENCOUNTER — APPOINTMENT (OUTPATIENT)
Dept: CARDIOLOGY | Facility: HOSPITAL | Age: 58
End: 2023-03-01

## 2023-03-01 ENCOUNTER — OUTPATIENT (OUTPATIENT)
Dept: OUTPATIENT SERVICES | Facility: HOSPITAL | Age: 58
LOS: 1 days | End: 2023-03-01
Payer: MEDICAID

## 2023-03-01 VITALS
RESPIRATION RATE: 14 BRPM | BODY MASS INDEX: 29.82 KG/M2 | SYSTOLIC BLOOD PRESSURE: 136 MMHG | HEART RATE: 91 BPM | OXYGEN SATURATION: 96 % | HEIGHT: 67 IN | WEIGHT: 190 LBS | DIASTOLIC BLOOD PRESSURE: 91 MMHG

## 2023-03-01 DIAGNOSIS — Z98.890 OTHER SPECIFIED POSTPROCEDURAL STATES: Chronic | ICD-10-CM

## 2023-03-01 DIAGNOSIS — I25.10 ATHEROSCLEROTIC HEART DISEASE OF NATIVE CORONARY ARTERY WITHOUT ANGINA PECTORIS: ICD-10-CM

## 2023-03-01 PROCEDURE — G0463: CPT

## 2023-03-01 PROCEDURE — 93005 ELECTROCARDIOGRAM TRACING: CPT

## 2023-03-02 NOTE — REASON FOR VISIT
[FreeTextEntry1] : 57y M w/ hx of inferolateral STEMI s/p DESx1 % (1/2020), gastritis presenting for f/u. Patient w/ no complaints. Denies CP, ALFARO, palpitations, orthopnea, PND, LE edema.\par \par Taking medications appropriately. \par \par ECG 2/2022\par NSR, low voltage, nonspecific ST-T changes\par \par Cardiac cath 1/2020\par VENTRICLES: EF estimated was 49 %.\par CORONARY VESSELS: The coronary circulation is right dominant.\par LM: -- LM: Angiography showed minor luminal irregularities with no flow limiting lesions.\par LAD: -- LAD: Angiography showed minor luminal irregularities with no flow limiting lesions.\par CX: -- Mid circumflex: There was a 100 % stenosis.\par RCA: -- RCA: Angiography showed minor luminal irregularities with no flow limiting lesions.\par COMPLICATIONS: There were no complications.\par DIAGNOSTIC RECOMMENDATIONS: ASA and Plavix for 1 year\par INTERVENTIONAL RECOMMENDATIONS: ASA and Plavix for 1 year\par Prepared and signed by\par Juma Luuqe M.D.\par Signed 01/16/2020 12:33:25\par \par Echo 1/2020\par Conclusions:\par EF 45% visual estimate\par 1. Mild segmental left ventricular systolic dysfunction.\par Endocardial visualization enhanced with intravenous injection of Ultrasonic Enhancing Agent (Definity). The\par inferolateral wall, and the inferior wall are hypokinetic.\par 2. Normal right ventricular size and function.\par 3. Normal tricuspid valve. Mild-moderate tricuspid regurgitation.\par ------------------------------------------------------------------------\par Confirmed on 1/16/2020 - 17:28:52 by Allison Flannery M.D.

## 2023-03-02 NOTE — REVIEW OF SYSTEMS
[SOB] : no shortness of breath [Dyspnea on exertion] : not dyspnea during exertion [Chest Discomfort] : no chest discomfort [Lower Ext Edema] : no extremity edema [Leg Claudication] : no intermittent leg claudication [Palpitations] : no palpitations [Orthopnea] : no orthopnea [PND] : no PND [Syncope] : no syncope [Cough] : no cough [Wheezing] : no wheezing [Coughing Up Blood] : no hemoptysis [Negative] : Heme/Lymph

## 2023-03-02 NOTE — PHYSICAL EXAM
[Well Developed] : well developed [Well Nourished] : well nourished [No Acute Distress] : no acute distress [Normal Conjunctiva] : normal conjunctiva [Normal Venous Pressure] : normal venous pressure [Normal S1, S2] : normal S1, S2 [No Murmur] : no murmur [No Rub] : no rub [Clear Lung Fields] : clear lung fields [Good Air Entry] : good air entry [No Respiratory Distress] : no respiratory distress  [Soft] : abdomen soft [Non Tender] : non-tender [No Masses/organomegaly] : no masses/organomegaly [Normal Gait] : normal gait [No Edema] : no edema [No Cyanosis] : no cyanosis [No Clubbing] : no clubbing [No Rash] : no rash [No Skin Lesions] : no skin lesions [Moves all extremities] : moves all extremities [No Focal Deficits] : no focal deficits [Alert and Oriented] : alert and oriented

## 2023-05-26 ENCOUNTER — APPOINTMENT (OUTPATIENT)
Dept: CV DIAGNOSITCS | Facility: HOSPITAL | Age: 58
End: 2023-05-26
Payer: MEDICAID

## 2023-05-26 ENCOUNTER — OUTPATIENT (OUTPATIENT)
Dept: OUTPATIENT SERVICES | Facility: HOSPITAL | Age: 58
LOS: 1 days | End: 2023-05-26
Payer: MEDICAID

## 2023-05-26 DIAGNOSIS — Z98.890 OTHER SPECIFIED POSTPROCEDURAL STATES: Chronic | ICD-10-CM

## 2023-05-26 DIAGNOSIS — I25.10 ATHEROSCLEROTIC HEART DISEASE OF NATIVE CORONARY ARTERY WITHOUT ANGINA PECTORIS: ICD-10-CM

## 2023-05-26 PROCEDURE — 93356 MYOCRD STRAIN IMG SPCKL TRCK: CPT

## 2023-05-26 PROCEDURE — 93306 TTE W/DOPPLER COMPLETE: CPT

## 2023-05-26 PROCEDURE — 76376 3D RENDER W/INTRP POSTPROCES: CPT

## 2023-05-26 PROCEDURE — 76376 3D RENDER W/INTRP POSTPROCES: CPT | Mod: 26

## 2023-05-26 PROCEDURE — 93306 TTE W/DOPPLER COMPLETE: CPT | Mod: 26

## 2023-07-31 NOTE — DISCHARGE NOTE PROVIDER - NSDCQMPLAVIX_CARD_A_CORE
PCP: NICOLE Mariscal NP    Last appt: 6/7/2023   Future Appointments   Date Time Provider [address]   11/1/2023  8:30 AM NICOLE Barakat NP [facility] BS AMB   1/2/2024  1:00 PM DO CYNTHIA Jo BS AMB       Requested Prescriptions     Pending Prescriptions Disp Refills    losartan (COZAAR) 100 MG tablet [Pharmacy Med Name: LOSARTAN POTASSIUM 100 MG Tablet] 90 tablet      Sig: TAKE 1 TABLET EVERY DAY    atorvastatin (LIPITOR) 20 MG tablet [Pharmacy Med Name: ATORVASTATIN CALCIUM 20 MG Tablet] 90 tablet      Sig: TAKE 1 TABLET EVERY DAY    amLODIPine (NORVASC) 10 MG tablet [Pharmacy Med Name: AMLODIPINE BESYLATE 10 MG Tablet] 90 tablet      Sig: TAKE 1 TABLET EVERY DAY    spironolactone (ALDACTONE) 25 MG tablet [Pharmacy Med Name: SPIRONOLACTONE 25 MG Tablet] 90 tablet      Sig: TAKE 1 TABLET EVERY DAY         Prior labs and Blood pressures:  BP Readings from Last 3 Encounters:   06/07/23 (!) 152/88   11/25/22 110/74   06/01/22 114/78     Lab Results   Component Value Date/Time     06/07/2023 10:16 AM    K 4.2 06/07/2023 10:16 AM     06/07/2023 10:16 AM    CO2 25 06/07/2023 10:16 AM    BUN 16 06/07/2023 10:16 AM    GFRAA >60 07/18/2022 08:23 AM     No results found for: HBA1C, SNR3EZOU  Lab Results   Component Value Date/Time    CHOL 128 06/07/2023 10:16 AM    HDL 40 06/07/2023 10:16 AM     No results found for: VITD3, VD3RIA    No results found for: TSH, TSH2, TSH3
No, not prescribed...

## 2023-09-05 ENCOUNTER — APPOINTMENT (OUTPATIENT)
Dept: CARDIOLOGY | Facility: HOSPITAL | Age: 58
End: 2023-09-05

## 2023-10-12 NOTE — PROGRESS NOTE ADULT - PROBLEM SELECTOR PROBLEM 3
Gastritis Dupixent Counseling: I discussed with the patient the risks of dupilumab including but not limited to eye infection and irritation, cold sores, injection site reactions, worsening of asthma, allergic reactions and increased risk of parasitic infection.  Live vaccines should be avoided while taking dupilumab. Dupilumab will also interact with certain medications such as warfarin and cyclosporine. The patient understands that monitoring is required and they must alert us or the primary physician if symptoms of infection or other concerning signs are noted.

## 2024-03-26 ENCOUNTER — OUTPATIENT (OUTPATIENT)
Dept: OUTPATIENT SERVICES | Facility: HOSPITAL | Age: 59
LOS: 1 days | End: 2024-03-26
Payer: COMMERCIAL

## 2024-03-26 ENCOUNTER — NON-APPOINTMENT (OUTPATIENT)
Age: 59
End: 2024-03-26

## 2024-03-26 ENCOUNTER — APPOINTMENT (OUTPATIENT)
Dept: CARDIOLOGY | Facility: HOSPITAL | Age: 59
End: 2024-03-26

## 2024-03-26 VITALS
HEART RATE: 57 BPM | DIASTOLIC BLOOD PRESSURE: 70 MMHG | OXYGEN SATURATION: 98 % | WEIGHT: 190 LBS | BODY MASS INDEX: 29.82 KG/M2 | HEIGHT: 67 IN | SYSTOLIC BLOOD PRESSURE: 105 MMHG

## 2024-03-26 DIAGNOSIS — I25.10 ATHEROSCLEROTIC HEART DISEASE OF NATIVE CORONARY ARTERY W/OUT ANGINA PECTORIS: ICD-10-CM

## 2024-03-26 DIAGNOSIS — Z98.61 ATHEROSCLEROTIC HEART DISEASE OF NATIVE CORONARY ARTERY W/OUT ANGINA PECTORIS: ICD-10-CM

## 2024-03-26 DIAGNOSIS — I25.10 ATHEROSCLEROTIC HEART DISEASE OF NATIVE CORONARY ARTERY WITHOUT ANGINA PECTORIS: ICD-10-CM

## 2024-03-26 DIAGNOSIS — Z98.890 OTHER SPECIFIED POSTPROCEDURAL STATES: Chronic | ICD-10-CM

## 2024-03-26 PROCEDURE — 93005 ELECTROCARDIOGRAM TRACING: CPT

## 2024-03-26 PROCEDURE — G0463: CPT

## 2024-03-26 RX ORDER — LISINOPRIL 20 MG/1
20 TABLET ORAL
Qty: 90 | Refills: 3 | Status: ACTIVE | OUTPATIENT
Start: 2020-01-21

## 2024-03-26 RX ORDER — ATORVASTATIN CALCIUM 80 MG/1
80 TABLET, FILM COATED ORAL DAILY
Qty: 90 | Refills: 3 | Status: ACTIVE | COMMUNITY
Start: 2020-01-21 | End: 1900-01-01

## 2024-03-26 RX ORDER — METOPROLOL SUCCINATE 100 MG/1
100 TABLET, EXTENDED RELEASE ORAL
Qty: 90 | Refills: 3 | Status: ACTIVE | COMMUNITY
Start: 2020-01-21 | End: 1900-01-01

## 2024-03-26 NOTE — ASSESSMENT
[FreeTextEntry1] : 57 yo M w/ hx of inferolateral STEMI s/p DESx1 % (1/2020), gastritis presenting for f/u.  #CAD s/p PCI x1  - C/w ASA, lipitor, lisinopril, metoprolol - Discussed proper diet/exercise - Repeat labs with primary   RTC 1 year Meds sent   Oniel Higuera PGY5 Cardiology Fellow  RED[facility] James

## 2024-03-26 NOTE — REASON FOR VISIT
[FreeTextEntry1] : 59 yo M w/ hx of inferolateral STEMI s/p DESx1 % (1/2020), gastritis presenting for f/u.  LCV 1/2023: Patient w/ no complaints. Denies CP, ALFARO, palpitations, orthopnea, PND, LE edema. Taking medications appropriately.  IE: TTE 5/2023 EF 55% inferolateral akinesis RV WNL  Today patient reports he is having intermittent L rib and back pain that usually occur at rest and last 2 hours and do not have any CP SOB or diaphoresis. 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 He denies palpitations or syncope. Denies FC NV CP SOB LE edema orthopnea or PND. Also having mild RLE pain on occasion and blurry vision at times, recommend he follow with his PMD. Needs refills on all of his meds, Rx sent today.     Cardiac cath 1/2020  VENTRICLES: EF estimated was 49 %. CORONARY VESSELS: The coronary circulation is right dominant. LM: -- LM: Angiography showed minor luminal irregularities with no flow limiting lesions. LAD: -- LAD: Angiography showed minor luminal irregularities with no flow limiting lesions. CX: -- Mid circumflex: There was a 100 % stenosis. RCA: -- RCA: Angiography showed minor luminal irregularities with no flow limiting lesions. COMPLICATIONS: There were no complications. DIAGNOSTIC RECOMMENDATIONS: ASA and Plavix for 1 year INTERVENTIONAL RECOMMENDATIONS: ASA and Plavix for 1 year  Echo 1/2020  Conclusions: EF 45% visual estimate 1. Mild segmental left ventricular systolic dysfunction. Endocardial visualization enhanced with intravenous injection of Ultrasonic Enhancing Agent (Definity). The inferolateral wall, and the inferior wall are hypokinetic. 2. Normal right ventricular size and function. 3. Normal tricuspid valve. Mild-moderate tricuspid regurgitation.

## 2024-03-26 NOTE — PHYSICAL EXAM
[Well Developed] : well developed [Well Nourished] : well nourished [No Acute Distress] : no acute distress [Normal Conjunctiva] : normal conjunctiva [Normal Venous Pressure] : normal venous pressure [No Carotid Bruit] : no carotid bruit [No Murmur] : no murmur [Normal S1, S2] : normal S1, S2 [No Rub] : no rub [No Gallop] : no gallop [Good Air Entry] : good air entry [Clear Lung Fields] : clear lung fields [No Respiratory Distress] : no respiratory distress  [Soft] : abdomen soft [No Masses/organomegaly] : no masses/organomegaly [Non Tender] : non-tender [Normal Bowel Sounds] : normal bowel sounds [Normal Gait] : normal gait [No Cyanosis] : no cyanosis [No Edema] : no edema [No Clubbing] : no clubbing [No Varicosities] : no varicosities [No Skin Lesions] : no skin lesions [No Rash] : no rash [Moves all extremities] : moves all extremities [No Focal Deficits] : no focal deficits [Alert and Oriented] : alert and oriented [Normal Speech] : normal speech [Normal memory] : normal memory

## 2024-12-19 ENCOUNTER — RX RENEWAL (OUTPATIENT)
Age: 59
End: 2024-12-19

## 2024-12-23 NOTE — DIETITIAN INITIAL EVALUATION ADULT. - NAME AND PHONE
will continue to follow for goc  Can be reached by TEAMS M-F 9-5 Ashley Hidalgo Any other time please page [phone] if needed Johanny Medina, Dietetic Intern x [phone]

## 2025-04-01 ENCOUNTER — OUTPATIENT (OUTPATIENT)
Dept: OUTPATIENT SERVICES | Facility: HOSPITAL | Age: 60
LOS: 1 days | End: 2025-04-01
Payer: COMMERCIAL

## 2025-04-01 ENCOUNTER — APPOINTMENT (OUTPATIENT)
Dept: CARDIOLOGY | Facility: HOSPITAL | Age: 60
End: 2025-04-01

## 2025-04-01 ENCOUNTER — RX RENEWAL (OUTPATIENT)
Age: 60
End: 2025-04-01

## 2025-04-01 ENCOUNTER — NON-APPOINTMENT (OUTPATIENT)
Age: 60
End: 2025-04-01

## 2025-04-01 VITALS
HEIGHT: 67 IN | DIASTOLIC BLOOD PRESSURE: 76 MMHG | WEIGHT: 190 LBS | BODY MASS INDEX: 29.82 KG/M2 | HEART RATE: 48 BPM | SYSTOLIC BLOOD PRESSURE: 119 MMHG | OXYGEN SATURATION: 98 %

## 2025-04-01 DIAGNOSIS — I25.10 ATHEROSCLEROTIC HEART DISEASE OF NATIVE CORONARY ARTERY W/OUT ANGINA PECTORIS: ICD-10-CM

## 2025-04-01 DIAGNOSIS — Z98.61 ATHEROSCLEROTIC HEART DISEASE OF NATIVE CORONARY ARTERY W/OUT ANGINA PECTORIS: ICD-10-CM

## 2025-04-01 DIAGNOSIS — Z98.890 OTHER SPECIFIED POSTPROCEDURAL STATES: Chronic | ICD-10-CM

## 2025-04-01 DIAGNOSIS — I25.10 ATHEROSCLEROTIC HEART DISEASE OF NATIVE CORONARY ARTERY WITHOUT ANGINA PECTORIS: ICD-10-CM

## 2025-04-01 PROCEDURE — G0463: CPT

## 2025-04-01 PROCEDURE — 93005 ELECTROCARDIOGRAM TRACING: CPT
